# Patient Record
Sex: FEMALE | Race: BLACK OR AFRICAN AMERICAN | Employment: PART TIME | URBAN - METROPOLITAN AREA
[De-identification: names, ages, dates, MRNs, and addresses within clinical notes are randomized per-mention and may not be internally consistent; named-entity substitution may affect disease eponyms.]

---

## 2017-02-17 ENCOUNTER — HOSPITAL ENCOUNTER (EMERGENCY)
Facility: CLINIC | Age: 32
Discharge: HOME | End: 2017-02-17
Attending: EMERGENCY MEDICINE | Admitting: EMERGENCY MEDICINE
Payer: COMMERCIAL

## 2017-02-17 ENCOUNTER — APPOINTMENT (OUTPATIENT)
Dept: ULTRASOUND IMAGING | Facility: CLINIC | Age: 32
End: 2017-02-17
Attending: EMERGENCY MEDICINE

## 2017-02-17 VITALS
RESPIRATION RATE: 18 BRPM | DIASTOLIC BLOOD PRESSURE: 60 MMHG | OXYGEN SATURATION: 100 % | BODY MASS INDEX: 34.86 KG/M2 | HEART RATE: 102 BPM | HEIGHT: 68 IN | SYSTOLIC BLOOD PRESSURE: 102 MMHG | WEIGHT: 230 LBS | TEMPERATURE: 98.3 F

## 2017-02-17 DIAGNOSIS — O20.9 HEMORRHAGE IN EARLY PREGNANCY: Primary | ICD-10-CM

## 2017-02-17 DIAGNOSIS — N93.9 VAGINAL BLEEDING: ICD-10-CM

## 2017-02-17 DIAGNOSIS — Z3A.01 7 WEEKS GESTATION OF PREGNANCY: ICD-10-CM

## 2017-02-17 LAB
ABO + RH BLD: NORMAL
ABO + RH BLD: NORMAL
B-HCG SERPL-ACNC: NORMAL IU/L
BASOPHILS # BLD AUTO: 0 10E9/L (ref 0–0.2)
BASOPHILS NFR BLD AUTO: 0.2 %
BLD GP AB SCN SERPL QL: NORMAL
BLOOD BANK CMNT PATIENT-IMP: NORMAL
DIFFERENTIAL METHOD BLD: NORMAL
EOSINOPHIL # BLD AUTO: 0.1 10E9/L (ref 0–0.7)
EOSINOPHIL NFR BLD AUTO: 1.6 %
ERYTHROCYTE [DISTWIDTH] IN BLOOD BY AUTOMATED COUNT: 14.9 % (ref 10–15)
HCT VFR BLD AUTO: 36.3 % (ref 35–47)
HGB BLD-MCNC: 11.8 G/DL (ref 11.7–15.7)
IMM GRANULOCYTES # BLD: 0 10E9/L (ref 0–0.4)
IMM GRANULOCYTES NFR BLD: 0.2 %
LYMPHOCYTES # BLD AUTO: 1.6 10E9/L (ref 0.8–5.3)
LYMPHOCYTES NFR BLD AUTO: 27.8 %
MCH RBC QN AUTO: 31.1 PG (ref 26.5–33)
MCHC RBC AUTO-ENTMCNC: 32.5 G/DL (ref 31.5–36.5)
MCV RBC AUTO: 96 FL (ref 78–100)
MONOCYTES # BLD AUTO: 0.4 10E9/L (ref 0–1.3)
MONOCYTES NFR BLD AUTO: 7.1 %
NEUTROPHILS # BLD AUTO: 3.5 10E9/L (ref 1.6–8.3)
NEUTROPHILS NFR BLD AUTO: 63.1 %
NRBC # BLD AUTO: 0 10*3/UL
NRBC BLD AUTO-RTO: 0 /100
PLATELET # BLD AUTO: 220 10E9/L (ref 150–450)
RBC # BLD AUTO: 3.8 10E12/L (ref 3.8–5.2)
SPECIMEN EXP DATE BLD: NORMAL
WBC # BLD AUTO: 5.6 10E9/L (ref 4–11)

## 2017-02-17 PROCEDURE — 85025 COMPLETE CBC W/AUTO DIFF WBC: CPT | Performed by: EMERGENCY MEDICINE

## 2017-02-17 PROCEDURE — 76801 OB US < 14 WKS SINGLE FETUS: CPT

## 2017-02-17 PROCEDURE — 86900 BLOOD TYPING SEROLOGIC ABO: CPT | Performed by: EMERGENCY MEDICINE

## 2017-02-17 PROCEDURE — 86850 RBC ANTIBODY SCREEN: CPT | Performed by: EMERGENCY MEDICINE

## 2017-02-17 PROCEDURE — 99284 EMERGENCY DEPT VISIT MOD MDM: CPT | Mod: Z6 | Performed by: EMERGENCY MEDICINE

## 2017-02-17 PROCEDURE — 99284 EMERGENCY DEPT VISIT MOD MDM: CPT | Mod: 25 | Performed by: EMERGENCY MEDICINE

## 2017-02-17 PROCEDURE — 84702 CHORIONIC GONADOTROPIN TEST: CPT | Performed by: EMERGENCY MEDICINE

## 2017-02-17 PROCEDURE — 86901 BLOOD TYPING SEROLOGIC RH(D): CPT | Performed by: EMERGENCY MEDICINE

## 2017-02-17 RX ORDER — PRENATAL VIT/IRON FUM/FOLIC AC 27MG-0.8MG
1 TABLET ORAL DAILY
COMMUNITY

## 2017-02-17 ASSESSMENT — ENCOUNTER SYMPTOMS
DIFFICULTY URINATING: 0
ABDOMINAL PAIN: 0
NECK STIFFNESS: 0
CONFUSION: 0
EYE REDNESS: 0
COLOR CHANGE: 0
HEADACHES: 0
SHORTNESS OF BREATH: 0
FEVER: 0
ARTHRALGIAS: 0

## 2017-02-17 NOTE — ED AVS SNAPSHOT
[facility], Emergency Department    [address]    [place] [address]    Phone:  [phone]    Fax:  [phone]                                       Satish Laguna   MRN: [id number]    Department:  [facility], Emergency Department   Date of Visit:  2/17/2017           Patient Information     Date Of Birth          1985        Your diagnoses for this visit were:     Vaginal bleeding        You were seen by Joseph Matson MD.      Follow-up Information     [facility]    Specialty:  Clinic    Contact information:    [address] [facility] [id number] [phone]        Discharge References/Attachments     PREGNANCY, BLEEDING DURING EARLY (ENGLISH)      24 Hour Appointment Hotline       To make an appointment at any [facility] clinic, call [phone] ([phone]). If you don't have a family doctor or clinic, we will help you find one. [facility] clinics are conveniently located to serve the needs of you and your family.             Review of your medicines      Our records show that you are taking the medicines listed below. If these are incorrect, please call your family doctor or clinic.        Dose / Directions Last dose taken    prenatal multivitamin  plus iron 27-0.8 MG Tabs per tablet   Dose:  1 tablet        Take 1 tablet by mouth daily   Refills:  0        TYLENOL PO   Dose:  500 mg        Take 500 mg by mouth every 8 hours as needed for mild pain or fever   Refills:  0                Procedures and tests performed during your visit     ABO/Rh type and screen    CBC with platelets differential    HCG quantitative pregnancy (blood)    OB  US 1st trimester w transvag      Orders Needing Specimen Collection     None      Pending Results     No orders found from 2/15/2017 to 2/18/2017.            Pending Culture Results     No orders found from 2/15/2017 to 2/18/2017.            Thank you for choosing [facility]       Thank you for choosing  "[facility] for your care. Our goal is always to provide you with excellent care. Hearing back from our patients is one way we can continue to improve our services. Please take a few minutes to complete the written survey that you may receive in the mail after you visit with us. Thank you!        [employer] Information     [employer] lets you send messages to your doctor, view your test results, renew your prescriptions, schedule appointments and more. To sign up, go to [email or web address] . Click on \"Log in\" on the left side of the screen, which will take you to the Welcome page. Then click on \"Sign up Now\" on the right side of the page.     You will be asked to enter the access code listed below, as well as some personal information. Please follow the directions to create your username and password.     Your access code is: [email or web address]  Expires: 2017 12:38 PM     Your access code will  in 90 days. If you need help or a new code, please call your [facility] clinic or [phone].        Care EveryWhere ID     This is your Care EveryWhere ID. This could be used by other organizations to access your [facility] medical records  [id number]        After Visit Summary       This is your record. Keep this with you and show to your community pharmacist(s) and doctor(s) at your next visit.                  "

## 2017-02-17 NOTE — ED PROVIDER NOTES
History     Chief Complaint   Patient presents with     Abdominal Pain     last cycle in Dec 21; poistive pregnant test last ; pinkish ligh little blood/ spotting; lower back pain with cramping     HPI  Satish Laguna is a 31 year old female with a history of  who presents to the Emergency Department for evaluation of abdominal pain.  The patient reports a normal period on 16 but had 3 days of bleeding on 17. She took an at home pregnancy test last week which was positive and made an appointment to see her OBGYN, who she has not yet seen. This morning she suddenly developed lower abdominal cramping, back pain and went to the bathroom and noticed light vaginal spotting. Her pain and bleeding lasted about 5 minutes. She has not experienced these symptoms with her prior pregnancies prompting her to come to the ED. Currently, she is asymptomatic. She denies decrease PO intake, nausea, vomiting or any other concerns or complaints at this time.     History reviewed. No pertinent past medical history.    History reviewed. No pertinent past surgical history.    No family history on file.    Social History   Substance Use Topics     Smoking status: Never Smoker     Smokeless tobacco: Not on file     Alcohol use No       No current facility-administered medications for this encounter.      Current Outpatient Prescriptions   Medication     Prenatal Vit-Fe Fumarate-FA (PRENATAL MULTIVITAMIN  PLUS IRON) 27-0.8 MG TABS per tablet     Acetaminophen (TYLENOL PO)     Facility-Administered Medications Ordered in Other Encounters   Medication     HYDROmorphone (PF) (DILAUDID) 1 MG/ML injection      No Known Allergies     I have reviewed the Medications, Allergies, Past Medical and Surgical History, and Social History in the Epic system.    Review of Systems   Constitutional: Negative for fever.   HENT: Negative for congestion.    Eyes: Negative for redness.   Respiratory: Negative for shortness of breath.   "  Cardiovascular: Negative for chest pain.   Gastrointestinal: Negative for abdominal pain.   Genitourinary: Negative for difficulty urinating.   Musculoskeletal: Negative for arthralgias and neck stiffness.   Skin: Negative for color change.   Neurological: Negative for headaches.   Psychiatric/Behavioral: Negative for confusion.       Physical Exam   BP: 126/56  Pulse: 96  Temp: 98.9  F (37.2  C)  Resp: 16  Height: 172.7 cm (5' 8\")  Weight: 104.3 kg (230 lb)  SpO2: 99 %  Physical Exam   Constitutional: No distress.   HENT:   Head: Atraumatic.   Mouth/Throat: Oropharynx is clear and moist. No oropharyngeal exudate.   Eyes: Pupils are equal, round, and reactive to light. No scleral icterus.   Cardiovascular: Normal heart sounds and intact distal pulses.    Pulmonary/Chest: Breath sounds normal. No respiratory distress.   Abdominal: Soft. Bowel sounds are normal. There is no tenderness.   Musculoskeletal: She exhibits no edema or tenderness.   Skin: Skin is warm. No rash noted. She is not diaphoretic.    scant amount of blood in the posterior portion of the vagina    ED Course     9:42 AM  The patient was seen and examined by Dr. Matson in Room 5.     ED Course     Procedures        Results for orders placed or performed during the hospital encounter of 02/17/17   OB  US 1st trimester w transvag    Narrative    ULTRASOUND OB LESS THAN 14 WEEKS WITH TRANSVAGINAL SINGLE IMAGING   2/17/2017 11:44 AM    HISTORY: Approximately 7 weeks pregnant with bleeding and cramping.    TECHNIQUE: Transabdominal imaging is performed.    COMPARISON:  None.    FINDINGS:      Endometrial stripe is thickened at 2.3 cm but no obvious gestational  sac is currently identified. Patient declined transvaginal imaging  therefore detection of a small sac is difficult. Overall exam is  difficult due to maternal body habitus.    Right ovary: Not visualized.  Left ovary: Not visualized.  Adnexal mass: None are visualized.  Free pelvic fluid: None.   "    Impression    IMPRESSION: Limited exam without the use of transvaginal imaging.  Thickened endometrium at 2.3 cm. Findings could be too early in the  pregnancy for visualization by ultrasound, however a missed  spontaneous  is also in the differential. Follow-up beta-hCG  and ultrasound is needed for confirmation.         PETER PAGAN MD   CBC with platelets differential   Result Value Ref Range    WBC 5.6 4.0 - 11.0 10e9/L    RBC Count 3.80 3.8 - 5.2 10e12/L    Hemoglobin 11.8 11.7 - 15.7 g/dL    Hematocrit 36.3 35.0 - 47.0 %    MCV 96 78 - 100 fl    MCH 31.1 26.5 - 33.0 pg    MCHC 32.5 31.5 - 36.5 g/dL    RDW 14.9 10.0 - 15.0 %    Platelet Count 220 150 - 450 10e9/L    Diff Method Automated Method     % Neutrophils 63.1 %    % Lymphocytes 27.8 %    % Monocytes 7.1 %    % Eosinophils 1.6 %    % Basophils 0.2 %    % Immature Granulocytes 0.2 %    Nucleated RBCs 0 0 /100    Absolute Neutrophil 3.5 1.6 - 8.3 10e9/L    Absolute Lymphocytes 1.6 0.8 - 5.3 10e9/L    Absolute Monocytes 0.4 0.0 - 1.3 10e9/L    Absolute Eosinophils 0.1 0.0 - 0.7 10e9/L    Absolute Basophils 0.0 0.0 - 0.2 10e9/L    Abs Immature Granulocytes 0.0 0 - 0.4 10e9/L    Absolute Nucleated RBC 0.0    HCG quantitative pregnancy (blood)   Result Value Ref Range    HCG Quantitative Serum 55590 IU/L   ABO/Rh type and screen   Result Value Ref Range    ABO O     RH(D)  Pos     Antibody Screen Neg     Test Valid Only At       [facility]    Specimen Expires 2017             Labs Ordered and Resulted from Time of ED Arrival Up to the Time of Departure from the ED   CBC WITH PLATELETS DIFFERENTIAL   HCG QUANTITATIVE PREGNANCY   ABO/RH TYPE AND SCREEN       Assessments & Plan (with Medical Decision Making)   31-year-old female presents for evaluation of scant amount of vaginal bleeding in the setting of presumed seven-week pregnancy.  Exam reveals no evidence of active bleeding.  CBC is unremarkable  and normal.  Rh is positive and quantitative hCG was 14,342.  OB ultrasound revealed thickened endometrium without other findings.  At this point in time, I warned the patient that this could represent early pregnancy, spontaneous or missed  or ectopic.  As she is currently not having any symptoms or signs, I have recommended follow-up as previously scheduled with her OB/GYN in the next week.  She was also given miscarriage precautions with instructions to return to the emergency room for excessive bleeding.    I have reviewed the nursing notes.    I have reviewed the findings, diagnosis, plan and need for follow up with the patient.    Discharge Medication List as of 2017 12:38 PM          Final diagnoses:   Vaginal bleeding     Yamila LAYTON, am serving as a trained medical scribe to document services personally performed by Joseph Matson MD, based on the provider's statements to me.      Joseph LAYTON MD, was physically present and have reviewed and verified the accuracy of this note documented by Yamila Johnson.     2017   [facility], [place], EMERGENCY DEPARTMENT     Joseph Matson MD  [phone]

## 2017-02-17 NOTE — ED AVS SNAPSHOT
[facility], [place], Emergency Department    [address]    [facility] [address]    Phone:  [phone]    Fax:  [phone]                                       Satish Laguna   MRN: [id number]    Department:  [facility], Emergency Department   Date of Visit:  2/17/2017           After Visit Summary Signature Page     I have received my discharge instructions, and my questions have been answered. I have discussed any challenges I see with this plan with the nurse or doctor.    ..........................................................................................................................................  Patient/Patient Representative Signature      ..........................................................................................................................................  Patient Representative Print Name and Relationship to Patient    ..................................................               ................................................  Date                                            Time    ..........................................................................................................................................  Reviewed by Signature/Title    ...................................................              ..............................................  Date                                                            Time

## 2017-03-16 ENCOUNTER — TELEPHONE (OUTPATIENT)
Dept: OTHER | Facility: CLINIC | Age: 32
End: 2017-03-16

## 2017-03-16 NOTE — TELEPHONE ENCOUNTER
Call Regarding Onboarding P1 Other    Attempt 1    Message     Comments: Per patient picked up and unaware that she was even insured through her employer's healthplan.       Outreach   Martha Samuels

## 2017-03-21 ENCOUNTER — APPOINTMENT (OUTPATIENT)
Dept: ULTRASOUND IMAGING | Facility: CLINIC | Age: 32
End: 2017-03-21
Attending: EMERGENCY MEDICINE
Payer: COMMERCIAL

## 2017-03-21 ENCOUNTER — HOSPITAL ENCOUNTER (EMERGENCY)
Facility: CLINIC | Age: 32
Discharge: HOME | End: 2017-03-21
Attending: EMERGENCY MEDICINE | Admitting: EMERGENCY MEDICINE
Payer: COMMERCIAL

## 2017-03-21 VITALS
TEMPERATURE: 97.4 F | OXYGEN SATURATION: 99 % | HEART RATE: 96 BPM | DIASTOLIC BLOOD PRESSURE: 67 MMHG | RESPIRATION RATE: 19 BRPM | SYSTOLIC BLOOD PRESSURE: 121 MMHG

## 2017-03-21 DIAGNOSIS — Z3A.10 10 WEEKS GESTATION OF PREGNANCY: Primary | ICD-10-CM

## 2017-03-21 DIAGNOSIS — O20.0 THREATENED ABORTION: ICD-10-CM

## 2017-03-21 LAB
B-HCG SERPL-ACNC: ABNORMAL IU/L (ref 0–5)
BASOPHILS # BLD AUTO: 0 10E9/L (ref 0–0.2)
BASOPHILS NFR BLD AUTO: 0.1 %
DIFFERENTIAL METHOD BLD: ABNORMAL
EOSINOPHIL # BLD AUTO: 0.1 10E9/L (ref 0–0.7)
EOSINOPHIL NFR BLD AUTO: 1.1 %
ERYTHROCYTE [DISTWIDTH] IN BLOOD BY AUTOMATED COUNT: 14.1 % (ref 10–15)
HCT VFR BLD AUTO: 32.4 % (ref 35–47)
HGB BLD-MCNC: 10.8 G/DL (ref 11.7–15.7)
IMM GRANULOCYTES # BLD: 0.1 10E9/L (ref 0–0.4)
IMM GRANULOCYTES NFR BLD: 0.7 %
LYMPHOCYTES # BLD AUTO: 1.4 10E9/L (ref 0.8–5.3)
LYMPHOCYTES NFR BLD AUTO: 18.8 %
MCH RBC QN AUTO: 31.1 PG (ref 26.5–33)
MCHC RBC AUTO-ENTMCNC: 33.3 G/DL (ref 31.5–36.5)
MCV RBC AUTO: 93 FL (ref 78–100)
MONOCYTES # BLD AUTO: 0.5 10E9/L (ref 0–1.3)
MONOCYTES NFR BLD AUTO: 6.4 %
NEUTROPHILS # BLD AUTO: 5.4 10E9/L (ref 1.6–8.3)
NEUTROPHILS NFR BLD AUTO: 72.9 %
NRBC # BLD AUTO: 0 10*3/UL
NRBC BLD AUTO-RTO: 0 /100
PLATELET # BLD AUTO: 265 10E9/L (ref 150–450)
RBC # BLD AUTO: 3.47 10E12/L (ref 3.8–5.2)
WBC # BLD AUTO: 7.4 10E9/L (ref 4–11)

## 2017-03-21 PROCEDURE — 84702 CHORIONIC GONADOTROPIN TEST: CPT | Performed by: EMERGENCY MEDICINE

## 2017-03-21 PROCEDURE — 76801 OB US < 14 WKS SINGLE FETUS: CPT

## 2017-03-21 PROCEDURE — 85025 COMPLETE CBC W/AUTO DIFF WBC: CPT | Performed by: EMERGENCY MEDICINE

## 2017-03-21 PROCEDURE — 99284 EMERGENCY DEPT VISIT MOD MDM: CPT | Mod: 25 | Performed by: EMERGENCY MEDICINE

## 2017-03-21 PROCEDURE — 99284 EMERGENCY DEPT VISIT MOD MDM: CPT | Mod: Z6 | Performed by: EMERGENCY MEDICINE

## 2017-03-21 ASSESSMENT — ENCOUNTER SYMPTOMS
CHILLS: 1
DIZZINESS: 1
ABDOMINAL PAIN: 0
FEVER: 0
SHORTNESS OF BREATH: 0

## 2017-03-21 NOTE — ED AVS SNAPSHOT
[facility], Emergency Department    [address]    [place] [address]    Phone:  [phone]    Fax:  [phone]                                       Satish Laguna   MRN: [id number]    Department:  [facility], Emergency Department   Date of Visit:  3/21/2017           Patient Information     Date Of Birth          1985        Your diagnoses for this visit were:     Threatened         You were seen by Carla Quarles MD.        Discharge Instructions       If you are soaking a full pad in an hour for more than 2 hours in a row, or have any fevers, chills, foul smelling vaginal discharge, or worsening abdominal pain, you should return to the ER. Follow up with your OB doctor tomorrow.    Discharge References/Attachments     MISCARRIAGE, INCOMPLETE (ENGLISH)      24 Hour Appointment Hotline       To make an appointment at any [facility] clinic, call [phone] ([phone]). If you don't have a family doctor or clinic, we will help you find one. [facility] clinics are conveniently located to serve the needs of you and your family.             Review of your medicines      Our records show that you are taking the medicines listed below. If these are incorrect, please call your family doctor or clinic.        Dose / Directions Last dose taken    prenatal multivitamin  plus iron 27-0.8 MG Tabs per tablet   Dose:  1 tablet        Take 1 tablet by mouth daily   Refills:  0        TYLENOL PO   Dose:  500 mg        Take 500 mg by mouth every 8 hours as needed for mild pain or fever   Refills:  0                Procedures and tests performed during your visit     CBC with platelets differential    HCG quantitative pregnancy (blood)    OB  US 1st trimester w transvag      Orders Needing Specimen Collection     None      Pending Results     No orders found from 3/19/2017 to 3/22/2017.            Pending Culture Results     No orders found from 3/19/2017 to 3/22/2017.            Thank you  "for choosing [facility]       Thank you for choosing [facility] for your care. Our goal is always to provide you with excellent care. Hearing back from our patients is one way we can continue to improve our services. Please take a few minutes to complete the written survey that you may receive in the mail after you visit with us. Thank you!        [employer] Information     [employer] lets you send messages to your doctor, view your test results, renew your prescriptions, schedule appointments and more. To sign up, go to [email or web address] . Click on \"Log in\" on the left side of the screen, which will take you to the Welcome page. Then click on \"Sign up Now\" on the right side of the page.     You will be asked to enter the access code listed below, as well as some personal information. Please follow the directions to create your username and password.     Your access code is: [email or web address]  Expires: 2017  1:38 PM     Your access code will  in 90 days. If you need help or a new code, please call your [facility] clinic or [phone].        Care EveryWhere ID     This is your Care EveryWhere ID. This could be used by other organizations to access your [facility] medical records  [id number]        After Visit Summary       This is your record. Keep this with you and show to your community pharmacist(s) and doctor(s) at your next visit.                  "

## 2017-03-21 NOTE — ED AVS SNAPSHOT
[facility], [place], Emergency Department    [address]    [facility] [address]    Phone:  [phone]    Fax:  [phone]                                       Satish Laguna   MRN: [id number]    Department:  [facility], Emergency Department   Date of Visit:  3/21/2017           After Visit Summary Signature Page     I have received my discharge instructions, and my questions have been answered. I have discussed any challenges I see with this plan with the nurse or doctor.    ..........................................................................................................................................  Patient/Patient Representative Signature      ..........................................................................................................................................  Patient Representative Print Name and Relationship to Patient    ..................................................               ................................................  Date                                            Time    ..........................................................................................................................................  Reviewed by Signature/Title    ...................................................              ..............................................  Date                                                            Time

## 2017-03-22 NOTE — DISCHARGE INSTRUCTIONS
If you are soaking a full pad in an hour for more than 2 hours in a row, or have any fevers, chills, foul smelling vaginal discharge, or worsening abdominal pain, you should return to the ER. Follow up with your OB doctor tomorrow.

## 2017-03-22 NOTE — ED PROVIDER NOTES
History     Chief Complaint   Patient presents with     Vaginal Bleeding     possible miscarriage; bleeding onset 1630 today at work; came home, passing blood and clots at home; seen by OBGYN prior to the bleeding and reportedly [id number] was fine and was told she was 10 weeks and 2 days gestation     HPI  Satish Laguna is a 32 year old female who presents to the Emergency Department with vaginal bleeding. She states that she went to an OB appointment today and had an ultrasound done 10w2d pregnant. She states that after getting home she was covered in blood. She states that she has saturated 2 pads today and has been passing clots. The patient states that she feels dizziness and chills.     She is .     Ultrasound from [facility] earlier today was read as:    Gestational sac: 4.3 cm, 19 weeks 5 days. Yolk sac 0.4 cm. Gestational sac remains low-lying with somewhat lobulated contours.    I have reviewed the Medications, Allergies, Past Medical and Surgical History, and Social History in the [facility] system.    PAST MEDICAL HISTORY  No past medical history on file.  PAST SURGICAL HISTORY  No past surgical history on file.  FAMILY HISTORY  No family history on file.  SOCIAL HISTORY  Social History   Substance Use Topics     Smoking status: Never Smoker     Smokeless tobacco: Not on file     Alcohol use No     MEDICATIONS  No current facility-administered medications for this encounter.      Current Outpatient Prescriptions   Medication     Prenatal Vit-Fe Fumarate-FA (PRENATAL MULTIVITAMIN  PLUS IRON) 27-0.8 MG TABS per tablet     Acetaminophen (TYLENOL PO)     Facility-Administered Medications Ordered in Other Encounters   Medication     HYDROmorphone (PF) (DILAUDID) 1 MG/ML injection     ALLERGIES  No Known Allergies   Review of Systems   Constitutional: Positive for chills. Negative for fever.   Respiratory: Negative for shortness of breath.    Cardiovascular: Negative for chest pain.    Gastrointestinal: Negative for abdominal pain.   Genitourinary: Positive for vaginal bleeding.   Neurological: Positive for dizziness.   All other systems reviewed and are negative.      Physical Exam   BP: (!) 117/38  Pulse: 98  Temp: 98.2  F (36.8  C)  Resp: 18  SpO2: 100 %  Physical Exam   Constitutional: No distress.   HENT:   Head: Atraumatic.   Mouth/Throat: Oropharynx is clear and moist. No oropharyngeal exudate.   Eyes: Pupils are equal, round, and reactive to light. No scleral icterus.   Cardiovascular: Normal heart sounds and intact distal pulses.    Pulmonary/Chest: Breath sounds normal. No respiratory distress.   Abdominal: Soft. Bowel sounds are normal. There is tenderness.       Genitourinary:   Genitourinary Comments: Pt clenches thighs together when bimanual is attempted -- unable to palpate cervix.   Musculoskeletal: She exhibits no edema or tenderness.   Skin: Skin is warm. No rash noted. She is not diaphoretic.       ED Course     ED Course     Procedures             Critical Care time:  none               Labs Ordered and Resulted from Time of ED Arrival Up to the Time of Departure from the ED   HCG QUANTITATIVE PREGNANCY - Abnormal; Notable for the following:        Result Value    HCG Quantitative Serum 16565 (*)     All other components within normal limits   CBC WITH PLATELETS DIFFERENTIAL - Abnormal; Notable for the following:     RBC Count 3.47 (*)     Hemoglobin 10.8 (*)     Hematocrit 32.4 (*)     All other components within normal limits       Assessments & Plan (with Medical Decision Making)   I attempted to do a bimanual exam, however, patient was really uncomfortable with this, and clenched her thighs together.  I was unable to adequately palpate the cervix.  I did get an ultrasound, which demonstrated an irregularly shaped gestational sac with 10 week 1 day live fetus in the lower uterine segment with probable dilation of the cervix, consistent with  in progress.  I did  phone the OB resident, who did come to see the patient.  The patient refused repeat pelvic exam.  They felt that the patient likely has an inevitable .  The patient preferred expected management, was uncomfortable with having any procedure that would terminate the pregnancy in an unnatural manner.  They discussed indications for emergent return, patient was comfortable the plan was discharged with the plan to follow-up tomorrow with her OB provider.  She appears hemodynamically stable at this time.        This part of the medical record was transcribed by Marky Freeman, Medical Scribe, from a dictation done by Carla Dickerson MD.     I have reviewed the nursing notes.  I have reviewed the findings, diagnosis, plan and need for follow up with the patient.    Discharge Medication List as of 3/21/2017 10:27 PM          Final diagnoses:   Threatened      I, Marky Freeman, am serving as a trained medical scribe to document services personally performed by Carla Quarles MD, based on the provider's statements to me.      ICarla MD, was physically present and have reviewed and verified the accuracy of this note documented by Marky Freeman.    3/21/2017   [facility], EMERGENCY DEPARTMENT     Carla Quarles MD  17 [phone]

## 2017-03-22 NOTE — CONSULTS
ED Consult Note - Ob/Gyn  Satish Laguna   1985  MRN [id number]    CC: vaginal bleeding    HPI: Satish Laguna is a 32 year old  at 10w0d gestation by 8w6d US who presents to the ED after passing a fist sized clot earlier today as well as many other smaller clots.  She reports she has known for about two weeks that she is undergoing a likely miscarriage.  Had some significant cramping when passing the clots, otherwise now not having any abdominal pain.  Feeling slightly tired and weak.  No fevers/chills, nausea or vomiting.  She is a patient at [facility] and has been followed with serial ultrasounds which show the pregnancy had moved downward into the lower uterine segment.    10 point ROS negative other than as noted above    OB Hx:  Three children;  section x3  No prior miscarriages    Medical History  Obesity    Surgical History   section x3    Current Facility-Administered Medications on File Prior to Encounter:  HYDROmorphone (PF) (DILAUDID) 1 MG/ML injection     Current Outpatient Prescriptions on File Prior to Encounter:  Prenatal Vit-Fe Fumarate-FA (PRENATAL MULTIVITAMIN  PLUS IRON) 27-0.8 MG TABS per tablet Take 1 tablet by mouth daily   Acetaminophen (TYLENOL PO) Take 500 mg by mouth every 8 hours as needed for mild pain or fever         No Known Allergies     Social History     Social History     Marital status:      Spouse name: N/A     Number of children: N/A     Years of education: N/A     Occupational History     Not on file.     Social History Main Topics     Smoking status: Never Smoker     Smokeless tobacco: Not on file     Alcohol use No     Drug use: No     Sexual activity: Not on file     Other Topics Concern     Not on file     Social History Narrative        Objective:  Vitals:    17 1850   BP: (!) 117/38   Pulse: 98   Resp: 18   Temp: 98.2  F (36.8  C)   TempSrc: Oral   SpO2: 100%      Gen: resting comfortably, NAD  Abd: soft, nontender  : one  small drop of blood on peripad; otherwise dry.  Patient declines repeat pelvic exam    Labs/Imaging:  Results for orders placed or performed during the hospital encounter of 03/21/17 (from the past 24 hour(s))   HCG quantitative pregnancy (blood)   Result Value Ref Range    HCG Quantitative Serum 50711 (H) 0 - 5 IU/L   CBC with platelets differential   Result Value Ref Range    WBC 7.4 4.0 - 11.0 10e9/L    RBC Count 3.47 (L) 3.8 - 5.2 10e12/L    Hemoglobin 10.8 (L) 11.7 - 15.7 g/dL    Hematocrit 32.4 (L) 35.0 - 47.0 %    MCV 93 78 - 100 fl    MCH 31.1 26.5 - 33.0 pg    MCHC 33.3 31.5 - 36.5 g/dL    RDW 14.1 10.0 - 15.0 %    Platelet Count 265 150 - 450 10e9/L    Diff Method Automated Method     % Neutrophils 72.9 %    % Lymphocytes 18.8 %    % Monocytes 6.4 %    % Eosinophils 1.1 %    % Basophils 0.1 %    % Immature Granulocytes 0.7 %    Nucleated RBCs 0 0 /100    Absolute Neutrophil 5.4 1.6 - 8.3 10e9/L    Absolute Lymphocytes 1.4 0.8 - 5.3 10e9/L    Absolute Monocytes 0.5 0.0 - 1.3 10e9/L    Absolute Eosinophils 0.1 0.0 - 0.7 10e9/L    Absolute Basophils 0.0 0.0 - 0.2 10e9/L    Abs Immature Granulocytes 0.1 0 - 0.4 10e9/L    Absolute Nucleated RBC 0.0    OB  US 1st trimester w transvag    Narrative    OBSTETRIC ULTRASOUND WITH ENDOVAGINAL TRANSDUCER    3/21/2017 8:23 PM     HISTORY: 10 weeks pregnant with bleeding.    TECHNIQUE:  Endovaginal sonography was added to the transabdominal  exam to better evaluate the uterus and ovaries because of inadequate  bladder fullness.    COMPARISON: None.    FINDINGS: There is an irregularly shaped gestational sac in the lower  uterine segment with mildly dilated cervix. Fetus is 3.2 cm long  consistent with 10 weeks 1 day gestational age, with heart rate of 186  bpm. Corpus luteum noted in the right ovary. Left ovary appears  normal. No free fluid.      Impression    IMPRESSION: Irregularly shaped gestational sac with 10 week 1 day plus  or minus 1 week live fetus in the lower  uterine segment with probable  dilatation of the cervix, consistent with  in progress.    PENNY BARNHART MD       Assessment/Plan: Satish Laguna is a 32 year old  at 10w0d by 8w6d US who presents with inevitable miscarriage.  Currently hemodynamically stable and bleeding has stopped for now.  Hgb 10.8; Rh positive.  Current ongoing fetal cardiac activity; patient does not want to do anything to interrupt the pregnancy due to this; but also understands this is going to end in a miscarriage and wishes it to be over.  Reviewed she is okay to continue with expectant management as that is her desire.  Advised that if she develop signs/symptoms of infection or hemorrhage, would need to intervene sooner.    -okay to discharge home  -patient will call clinic tomorrow and update about ED visit; f/u with Primary OB  -okay to start iron supplementation per patient request  -return to the ED with vaginal bleeding soaking 1 pad per hour for more than 2 hours in a row; any fevers/chills, purulent vaginal discharge or worsening abdominal pain  -patient counseled on what to expect with miscarriage and likely pattern of bleeding/cramping she will experience.    Patient seen and staffed with Dr. Yamila Ray MD  OB/GYN Resident, PGY3  17 10:38 PM      Physician Attestation   I, Yamila Boo, personally examined and evaluated this patient.  I discussed the patient with the resident and care team, and agree with the assessment and plan of care as documented in the resident s note of 17  [date].      I personally reviewed vital signs, medications, labs and exam.    Key findings: Bleeding stable. Inevitable miscarriage. Patient has close clinic follow-up available.  Yamila Boo  Date of Service (when I saw the patient): 17

## 2017-03-24 ENCOUNTER — APPOINTMENT (OUTPATIENT)
Dept: ULTRASOUND IMAGING | Facility: CLINIC | Age: 32
End: 2017-03-24
Attending: EMERGENCY MEDICINE
Payer: COMMERCIAL

## 2017-03-24 ENCOUNTER — HOSPITAL ENCOUNTER (OUTPATIENT)
Facility: CLINIC | Age: 32
Setting detail: OBSERVATION
Discharge: HOME | End: 2017-03-25
Attending: EMERGENCY MEDICINE | Admitting: OBSTETRICS & GYNECOLOGY
Payer: COMMERCIAL

## 2017-03-24 DIAGNOSIS — D50.9 IRON DEFICIENCY ANEMIA, UNSPECIFIED IRON DEFICIENCY ANEMIA TYPE: ICD-10-CM

## 2017-03-24 DIAGNOSIS — D62 ANEMIA DUE TO BLOOD LOSS, ACUTE: ICD-10-CM

## 2017-03-24 DIAGNOSIS — N93.9 VAGINAL BLEEDING: ICD-10-CM

## 2017-03-24 DIAGNOSIS — O03.9 SPONTANEOUS ABORTION: Primary | ICD-10-CM

## 2017-03-24 LAB
ABO + RH BLD: NORMAL
ABO + RH BLD: NORMAL
ALBUMIN UR-MCNC: 30 MG/DL
ANION GAP SERPL CALCULATED.3IONS-SCNC: 8 MMOL/L (ref 3–14)
APPEARANCE UR: ABNORMAL
B-HCG SERPL-ACNC: ABNORMAL IU/L (ref 0–5)
BACTERIA #/AREA URNS HPF: ABNORMAL /HPF
BASOPHILS # BLD AUTO: 0 10E9/L (ref 0–0.2)
BASOPHILS NFR BLD AUTO: 0.1 %
BILIRUB UR QL STRIP: NEGATIVE
BLD GP AB SCN SERPL QL: NORMAL
BLD PROD TYP BPU: NORMAL
BLOOD BANK CMNT PATIENT-IMP: NORMAL
BUN SERPL-MCNC: 12 MG/DL (ref 7–30)
CALCIUM SERPL-MCNC: 8.3 MG/DL (ref 8.5–10.1)
CHLORIDE SERPL-SCNC: 105 MMOL/L (ref 94–109)
CO2 SERPL-SCNC: 25 MMOL/L (ref 20–32)
COLOR UR AUTO: ABNORMAL
CREAT SERPL-MCNC: 0.54 MG/DL (ref 0.52–1.04)
DIFFERENTIAL METHOD BLD: ABNORMAL
EOSINOPHIL # BLD AUTO: 0.1 10E9/L (ref 0–0.7)
EOSINOPHIL NFR BLD AUTO: 0.6 %
ERYTHROCYTE [DISTWIDTH] IN BLOOD BY AUTOMATED COUNT: 14.2 % (ref 10–15)
GFR SERPL CREATININE-BSD FRML MDRD: ABNORMAL ML/MIN/1.7M2
GLUCOSE SERPL-MCNC: 139 MG/DL (ref 70–99)
GLUCOSE UR STRIP-MCNC: NEGATIVE MG/DL
HCT VFR BLD AUTO: 27.4 % (ref 35–47)
HGB BLD-MCNC: 6.6 G/DL (ref 11.7–15.7)
HGB BLD-MCNC: 9 G/DL (ref 11.7–15.7)
HGB UR QL STRIP: ABNORMAL
HYALINE CASTS #/AREA URNS LPF: 9 /LPF (ref 0–2)
IMM GRANULOCYTES # BLD: 0 10E9/L (ref 0–0.4)
IMM GRANULOCYTES NFR BLD: 0.3 %
INR PPP: 1.11 (ref 0.86–1.14)
KETONES UR STRIP-MCNC: NEGATIVE MG/DL
LEUKOCYTE ESTERASE UR QL STRIP: ABNORMAL
LYMPHOCYTES # BLD AUTO: 1.5 10E9/L (ref 0.8–5.3)
LYMPHOCYTES NFR BLD AUTO: 14.1 %
MCH RBC QN AUTO: 30.7 PG (ref 26.5–33)
MCHC RBC AUTO-ENTMCNC: 32.8 G/DL (ref 31.5–36.5)
MCV RBC AUTO: 94 FL (ref 78–100)
MONOCYTES # BLD AUTO: 0.5 10E9/L (ref 0–1.3)
MONOCYTES NFR BLD AUTO: 4.8 %
MUCOUS THREADS #/AREA URNS LPF: PRESENT /LPF
NEUTROPHILS # BLD AUTO: 8.4 10E9/L (ref 1.6–8.3)
NEUTROPHILS NFR BLD AUTO: 80.1 %
NITRATE UR QL: NEGATIVE
NRBC # BLD AUTO: 0 10*3/UL
NRBC BLD AUTO-RTO: 0 /100
NUM BPU REQUESTED: 1
PH UR STRIP: 5.5 PH (ref 5–7)
PLATELET # BLD AUTO: 245 10E9/L (ref 150–450)
POTASSIUM SERPL-SCNC: 3.6 MMOL/L (ref 3.4–5.3)
RBC # BLD AUTO: 2.93 10E12/L (ref 3.8–5.2)
RBC #/AREA URNS AUTO: >182 /HPF (ref 0–2)
SODIUM SERPL-SCNC: 138 MMOL/L (ref 133–144)
SP GR UR STRIP: 1.01 (ref 1–1.03)
SPECIMEN EXP DATE BLD: NORMAL
URN SPEC COLLECT METH UR: ABNORMAL
UROBILINOGEN UR STRIP-MCNC: 0.2 MG/DL (ref 0–2)
WBC # BLD AUTO: 10.5 10E9/L (ref 4–11)
WBC #/AREA URNS AUTO: 33 /HPF (ref 0–2)

## 2017-03-24 PROCEDURE — 86850 RBC ANTIBODY SCREEN: CPT | Performed by: EMERGENCY MEDICINE

## 2017-03-24 PROCEDURE — 81001 URINALYSIS AUTO W/SCOPE: CPT | Performed by: EMERGENCY MEDICINE

## 2017-03-24 PROCEDURE — 99214 OFFICE O/P EST MOD 30 MIN: CPT | Mod: GC | Performed by: OBSTETRICS & GYNECOLOGY

## 2017-03-24 PROCEDURE — 80048 BASIC METABOLIC PNL TOTAL CA: CPT | Performed by: EMERGENCY MEDICINE

## 2017-03-24 PROCEDURE — 85018 HEMOGLOBIN: CPT | Mod: 91 | Performed by: EMERGENCY MEDICINE

## 2017-03-24 PROCEDURE — 85025 COMPLETE CBC W/AUTO DIFF WBC: CPT | Performed by: EMERGENCY MEDICINE

## 2017-03-24 PROCEDURE — 84702 CHORIONIC GONADOTROPIN TEST: CPT | Performed by: EMERGENCY MEDICINE

## 2017-03-24 PROCEDURE — 96361 HYDRATE IV INFUSION ADD-ON: CPT | Performed by: EMERGENCY MEDICINE

## 2017-03-24 PROCEDURE — 25000128 H RX IP 250 OP 636

## 2017-03-24 PROCEDURE — 85610 PROTHROMBIN TIME: CPT | Performed by: EMERGENCY MEDICINE

## 2017-03-24 PROCEDURE — 86923 COMPATIBILITY TEST ELECTRIC: CPT | Performed by: EMERGENCY MEDICINE

## 2017-03-24 PROCEDURE — 96360 HYDRATION IV INFUSION INIT: CPT | Performed by: EMERGENCY MEDICINE

## 2017-03-24 PROCEDURE — 76801 OB US < 14 WKS SINGLE FETUS: CPT

## 2017-03-24 PROCEDURE — 99285 EMERGENCY DEPT VISIT HI MDM: CPT | Mod: Z6 | Performed by: EMERGENCY MEDICINE

## 2017-03-24 PROCEDURE — 86901 BLOOD TYPING SEROLOGIC RH(D): CPT | Performed by: EMERGENCY MEDICINE

## 2017-03-24 PROCEDURE — 25000128 H RX IP 250 OP 636: Performed by: EMERGENCY MEDICINE

## 2017-03-24 PROCEDURE — 86900 BLOOD TYPING SEROLOGIC ABO: CPT | Performed by: EMERGENCY MEDICINE

## 2017-03-24 PROCEDURE — 99285 EMERGENCY DEPT VISIT HI MDM: CPT | Mod: 25 | Performed by: EMERGENCY MEDICINE

## 2017-03-24 PROCEDURE — 87086 URINE CULTURE/COLONY COUNT: CPT | Performed by: EMERGENCY MEDICINE

## 2017-03-24 RX ORDER — SODIUM CHLORIDE 9 MG/ML
INJECTION, SOLUTION INTRAVENOUS
Status: COMPLETED
Start: 2017-03-24 | End: 2017-03-24

## 2017-03-24 RX ORDER — SODIUM CHLORIDE 9 MG/ML
1000 INJECTION, SOLUTION INTRAVENOUS CONTINUOUS
Status: DISCONTINUED | OUTPATIENT
Start: 2017-03-24 | End: 2017-03-25 | Stop reason: HOSPADM

## 2017-03-24 RX ADMIN — SODIUM CHLORIDE 1000 ML: 9 INJECTION, SOLUTION INTRAVENOUS at 22:12

## 2017-03-24 RX ADMIN — SODIUM CHLORIDE 1000 ML: 9 INJECTION, SOLUTION INTRAVENOUS at 19:37

## 2017-03-24 RX ADMIN — SODIUM CHLORIDE 1000 ML: 900 INJECTION, SOLUTION INTRAVENOUS at 19:37

## 2017-03-24 RX ADMIN — Medication 1000 ML: at 18:51

## 2017-03-24 RX ADMIN — SODIUM CHLORIDE: 900 INJECTION, SOLUTION INTRAVENOUS at 18:10

## 2017-03-24 RX ADMIN — SODIUM CHLORIDE 1000 ML: 9 INJECTION, SOLUTION INTRAVENOUS at 18:51

## 2017-03-24 ASSESSMENT — ENCOUNTER SYMPTOMS
FREQUENCY: 0
NAUSEA: 0
HEMATURIA: 0
DIARRHEA: 0
LIGHT-HEADEDNESS: 1
CONSTIPATION: 0
VOMITING: 0
DIZZINESS: 1
DIFFICULTY URINATING: 0
DYSURIA: 0
SHORTNESS OF BREATH: 0
ABDOMINAL PAIN: 1

## 2017-03-24 NOTE — ED PROVIDER NOTES
History     Chief Complaint   Patient presents with     Vaginal Bleeding     HPI  Satish Laguna is a 32 year old,  female, 10w4d pregnant by ultrasound on 3/21/17 who presents with vaginal bleeding. The patient reports that she started having vaginal bleeding again today around 3:45 PM. Initially, the bleeding was very heavy. She did pass some tissue, which she reports was blue in color, followed by rather large clots. She notes that the bleeding has since slowed, though she continues to pass. She complains of some dizziness and lightheadedness. She denies any syncope, and reports this improved after sitting down on the couch. She denies any chest pain, shortness of breath, nausea, vomiting or any changes in bowel or bladder habits. She complains of lower abdominal cramping.  No other new symptoms or complaints at this time.  Please see ROS for further details.    Of note, the patient was seen here in the ED for vaginal bleeding on 3/21/17 (3 days ago) and at that time, she had an ultrasound which revealed an irregularly shaped gestational sac with 10 week 1 day live fetus in the lower uterine segment with probable dilation of the cervix, consistent with  in progress.     Pertinent PMH/PSH: Inevtivable AB on recent visit. Otherwise see EMR.     No family history on file.    Social History   Substance Use Topics     Smoking status: Never Smoker     Smokeless tobacco: Not on file     Alcohol use No     Current Facility-Administered Medications   Medication     0.9% sodium chloride BOLUS    Followed by     0.9% sodium chloride infusion     Current Outpatient Prescriptions   Medication     Prenatal Vit-Fe Fumarate-FA (PRENATAL MULTIVITAMIN  PLUS IRON) 27-0.8 MG TABS per tablet     Acetaminophen (TYLENOL PO)     Facility-Administered Medications Ordered in Other Encounters   Medication     HYDROmorphone (PF) (DILAUDID) 1 MG/ML injection      No Known Allergies     I have reviewed the Medications,  Allergies, Past Medical and Surgical History, and Social History in the Epic system.    Review of Systems   Respiratory: Negative for shortness of breath.    Cardiovascular: Negative for chest pain.   Gastrointestinal: Positive for abdominal pain (lower, cramping). Negative for constipation, diarrhea, nausea and vomiting.   Genitourinary: Positive for vaginal bleeding. Negative for difficulty urinating, dysuria, frequency, hematuria and urgency.   Neurological: Positive for dizziness and light-headedness.   All other systems reviewed and are negative.      Physical Exam   BP: 101/65  Pulse: 94  Temp: 97.3  F (36.3  C)  Resp: 16  Weight: 104.3 kg (230 lb)  SpO2: 100 %  Physical Exam  CONSTITUTIONAL: Well-developed and well-nourished. Awake and alert. Non-toxic appearance. No acute distress.   HENT:   - Head: Normocephalic and atraumatic.   - Ears: Hearing and external ear grossly normal.   - Nose: Nose normal. No rhinorrhea. No epistaxis.   - Mouth/Throat: Oropharynx is clear and moist and mucous membranes are normal.   EYES: Conjunctivae and lids are normal. No scleral icterus.   NECK: Normal range of motion and phonation normal. Neck supple. No JVD present. No tracheal deviation, no stridor. No edema or erythema noted.  CARDIOVASCULAR: Normal rate, regular rhythm and no appreciable abnormal heart sounds.  PULMONARY/CHEST: Effort normal. No accessory muscle usage or stridor. No respiratory distress.  No appreciable abnormal breath sounds.  ABDOMEN: Soft, non-distended.  Mild lower abdominal discomfort to palpation.  No peritoneal findings.  No rigidity, rebound or guarding.   MUSCULOSKELETAL: Extremities warm and seemingly well perfused. No edema or calf tenderness.  NEUROLOGIC: Awake, alert. Not disoriented. She displays no atrophy and no tremor. Normal tone. No seizure activity. Coordination normal. GCS 15  SKIN: Skin is warm and dry. No rash noted. No diaphoresis. No pallor.   PSYCHIATRIC: Normal mood and affect.  Speech and behavior normal. Thought processes linear. Cognition and memory are normal.     ED Course     ED Course   Value Comment By Time   Hemoglobin: (!) 9.0 Down from 10.8 (3 days ago), still bleeding. Just passed more clots. Will get serial Hgbs. Thelma Iraheta MD 1920    Discussed patient's case with OB.  They will follow up on her US results and consult for us. Thelma Iraheta MD 2012    Spoke with Radiology about pt's US after written prelim report wouldn't come through correctly: Cervical canal still thickened, still remaining 4mm indeterminant cystic area, still has complex material in the cervix. No evidence for living fetus. Can't exclude retained products. Thelma Iraheta MD 2226     Procedures     6:33 PM  The patient was seen and examined by Dr. Iraheta in Room 6.                 Labs Ordered and Resulted from Time of ED Arrival Up to the Time of Departure from the ED   CBC WITH PLATELETS DIFFERENTIAL - Abnormal; Notable for the following:        Result Value    RBC Count 2.93 (*)     Hemoglobin 9.0 (*)     Hematocrit 27.4 (*)     Absolute Neutrophil 8.4 (*)     All other components within normal limits   BASIC METABOLIC PANEL - Abnormal; Notable for the following:     Glucose 139 (*)     Calcium 8.3 (*)     All other components within normal limits   INR   HCG QUANTITATIVE PREGNANCY   ROUTINE UA WITH MICROSCOPIC REFLEX TO CULTURE   ABO/RH TYPE AND SCREEN       Assessments & Plan (with Medical Decision Making)   IMPRESSION: 32-year-old female, , at approximately 10w5d gestation, who was just seen in the ED 3 days ago for VB.  During that ED visit a pelvic ultrasound was obtained that showed an irregularly shaped gestational sac with living fetus in the lower uterine segment with probable dilation of the cervix consistent with ongoing AB. Pt refused pelvic exam at that time. OB thought to be inevitable AB, discussed options and pt elected to D/C and F/U with OB the next  day.  Patient presents today with increased VB and passing of clots, brief episode of dizziness sensation (improved with lying flat and IVF), as described further in the HPI/ROS.  Clinically, she appears nontoxic.  Her BP is on the low end of normal currently though she has been in this range before in February prior to that and typically had somewhat higher numbers. HR is not significant change from previous ranges.  Afebrile. Mild lower abdominal pain on palpation. No peritoneal findings.     It sounds as though patient may have completed her previous inevitable AB.  That said even though she is laying flat and has already gotten nearly a liter of fluids she is still feeling dizzy and I am concerned for what sounds like a fairly significant amount of blood loss.  We will be getting laboratory studies and serial hemoglobins as well as performing pelvic ultrasound to evaluate for any retained POC or other acute findings.  Continue to monitor closely for hemodynamics, and potential need for transfusion, etc. Patient defers pelvic exam until pelvic US results.    PLAN: Labs (including type & screen, bHCG quant), etc. Pelvic US . Urine studies. Symptom/fluid management.     RESULTS:  - See ED Course above for pertinent results and correlating MDM  - Labs: Hgb decreased  - Urine:  Difficult to assess the setting of significant amount of blood.  Will wait to write any prescriptions for UTI until urine culture returns.  - Imaging: Images and written preliminary reports reviewed by myself and revealed:  --- Pelvic US: Spontaneous AB    INTERVENTIONS:   - IVF    RE-EVALUATION:  - The patient's symptoms were initially somewhat improved but then she continued to complain of dizziness even when lying down.  - Repeat hemoglobin 6.6  - BP remains appropriate.     DISCUSSIONS:  - Discuss. findings from today.  After return of new hemoglobin being markedly lower we discussed the R/B/A of blood transfusions which the patient has  agreed and signed the appropriate consent form.     DISPOSITION/PLANNING:  - FINAL IMPRESSION: Vaginal bleeding with acute blood loss anemia requiring transfusion  - DISPOSITION:  Admit to ObGyn  --- Pending: Urine culture      ______________________________________________________________________________    - I have reviewed the findings, plan with the patient and her family. They expressed understanding and agreement with this plan. All questions answered to the best of our ability at this time.           New Prescriptions    No medications on file       Final diagnoses:   None     IGi, am serving as a trained medical scribe to document services personally performed by Thelma Iraheta MD, based on the provider's statements to me.   Thelma LAYTON MD, was physically present and have reviewed and verified the accuracy of this note documented by Gi Jurado.     3/24/2017   [facility], EMERGENCY DEPARTMENT     Thelma Iraheta MD  03/26/17 9762

## 2017-03-24 NOTE — IP AVS SNAPSHOT
"                  MRN:[id number]                      After Visit Summary   3/24/2017    Satish Laguna    MRN: [id number]           Thank you!     Thank you for choosing [facility] for your care. Our goal is always to provide you with excellent care. Hearing back from our patients is one way we can continue to improve our services. Please take a few minutes to complete the written survey that you may receive in the mail after you visit with us. Thank you!        Patient Information     Date Of Birth          1985        About your hospital stay     You were admitted on:  March 25, 2017 You last received care in the:  [facility]    You were discharged on:  March 25, 2017       Who to Call     For medical emergencies, please call 911.  For non-urgent questions about your medical care, please call your primary care provider or clinic, [phone]          Attending Provider     Provider Specialty    hTelma Iraheta MD Emergency Medicine    Casandra Adorno MD OB/Gyn       Primary Care Provider Office Phone # Fax #    [facility] [phone] [address] [address]        Pending Results     Date and Time Order Name Status Description    3/24/2017 2054 Urine Culture Aerobic Bacterial Preliminary             Statement of Approval     Ordered          03/25/17 1117  I have reviewed and agree with all the recommendations and orders detailed in this document.  EFFECTIVE NOW     Approved and electronically signed by:  Rosa Mosher MD             Admission Information     Date & Time Provider Department Dept. Phone    3/24/2017 Casandra Adorno MD [facility] [phone]      Your Vitals Were     Blood Pressure Pulse Temperature Respirations Height Weight    122/66 (BP Location: Right arm) 96 97.3  F (36.3  C) (Oral) 16 1.727 m (5' 8\") 115.4 kg (254 lb 6.4 oz)    Last Period Pulse Oximetry BMI (Body Mass Index)             (LMP Unknown) 100% 38.68 kg/m2       " "  MyChart Information     [facility] lets you send messages to your doctor, view your test results, renew your prescriptions, schedule appointments and more. To sign up, go to [email or web address] . Click on \"Log in\" on the left side of the screen, which will take you to the Welcome page. Then click on \"Sign up Now\" on the right side of the page.     You will be asked to enter the access code listed below, as well as some personal information. Please follow the directions to create your username and password.     Your access code is: [email or web address]  Expires: 2017  1:38 PM     Your access code will  in 90 days. If you need help or a new code, please call your [facility] clinic or [phone].        Care EveryWhere ID     This is your Care EveryWhere ID. This could be used by other organizations to access your [facility] medical records  [id number]           Review of your medicines      UNREVIEWED medicines. Ask your doctor about these medicines        Dose / Directions    TYLENOL PO        Dose:  500 mg   Take 500 mg by mouth every 8 hours as needed for mild pain or fever   Refills:  0         START taking        Dose / Directions    calcium carbonate-vitamin D 600-400 MG-UNIT Chew   Used for:  Spontaneous         Dose:  1 chew tab   Take 1 chew tab by mouth 2 times daily   Quantity:  180 tablet   Refills:  3       docusate sodium 100 MG capsule   Commonly known as:  COLACE        Dose:  100 mg   Take 1 capsule (100 mg) by mouth 2 times daily as needed for constipation   Quantity:  20 capsule   Refills:  0       ferrous sulfate 142 (45 FE) MG Tbcr   Commonly known as:  SLO-FE   Used for:  Vaginal bleeding, Spontaneous         Dose:  142 mg   Take 1 tablet (142 mg) by mouth daily   Quantity:  50 tablet   Refills:  1       ibuprofen 600 MG tablet   Commonly known as:  ADVIL/MOTRIN   Used for:  Spontaneous         Dose:  600 mg   Take 1 tablet (600 mg) by mouth every 6 hours as needed " for other (mild pain)   Quantity:  30 tablet   Refills:  0         CONTINUE these medicines which have NOT CHANGED        Dose / Directions    prenatal multivitamin  plus iron 27-0.8 MG Tabs per tablet        Dose:  1 tablet   Take 1 tablet by mouth daily   Refills:  0            Where to get your medicines      These medications were sent to [facility] [place], [place] - [address]  [address], [facility] [address]     Phone:  [phone]     calcium carbonate-vitamin D 600-400 MG-UNIT Chew    docusate sodium 100 MG capsule    ferrous sulfate 142 (45 FE) MG Tbcr    ibuprofen 600 MG tablet                Protect others around you: Learn how to safely use, store and throw away your medicines at www.disposemymeds.org.             Medication List: This is a list of all your medications and when to take them. Check marks below indicate your daily home schedule. Keep this list as a reference.      Medications           Morning Afternoon Evening Bedtime As Needed    calcium carbonate-vitamin D 600-400 MG-UNIT Chew   Take 1 chew tab by mouth 2 times daily                                docusate sodium 100 MG capsule   Commonly known as:  COLACE   Take 1 capsule (100 mg) by mouth 2 times daily as needed for constipation                                ferrous sulfate 142 (45 FE) MG Tbcr   Commonly known as:  SLO-FE   Take 1 tablet (142 mg) by mouth daily                                ibuprofen 600 MG tablet   Commonly known as:  ADVIL/MOTRIN   Take 1 tablet (600 mg) by mouth every 6 hours as needed for other (mild pain)                                prenatal multivitamin  plus iron 27-0.8 MG Tabs per tablet   Take 1 tablet by mouth daily                                TYLENOL PO   Take 500 mg by mouth every 8 hours as needed for mild pain or fever

## 2017-03-24 NOTE — IP AVS SNAPSHOT
[facility] [address] [facility]    [address]    [place] [address]    Phone:  [phone]                                       After Visit Summary   3/24/2017    Satish Laguna    MRN: [id number]           After Visit Summary Signature Page     I have received my discharge instructions, and my questions have been answered. I have discussed any challenges I see with this plan with the nurse or doctor.    ..........................................................................................................................................  Patient/Patient Representative Signature      ..........................................................................................................................................  Patient Representative Print Name and Relationship to Patient    ..................................................               ................................................  Date                                            Time    ..........................................................................................................................................  Reviewed by Signature/Title    ...................................................              ..............................................  Date                                                            Time

## 2017-03-24 NOTE — ED NOTES
"Per EMS report:   + miscarriage today, per EMS pt has been told about possibility of miscarriage. Last night pt experienced cramping sensation, pt took Tylenol then went to sleep. Today at around 3:45 pm pt began to bleed \"a lot of blood\" + dizziness  Pt has a G18 R Arm; IV bolus was given about 300-400 ml. VS: 111/77 HR:99 O2 sat 100% at 10-12 LPM non re breather mask. B  "

## 2017-03-24 NOTE — ED NOTES
Pt arrived with EMS with a lot of vaginal bleeding. Upon standing pt passed several large clots. Ambulated to the bathroom without issue where she passed more clots and discarded a soaked pad. Felt dizzy; O2 administered, which was helpful. Assisted back to bed with a new pad placed. VSS. Continues on supplemental O2 and IV fluids running upon arrival. Labs drawn from PIV and sent. Pt feeling better laying down with O2.

## 2017-03-24 NOTE — LETTER
[facility] [address]  [address]  [place] [address]  [phone]  Clinic #:  [phone]         March 25, 2017      Satish Laguna  [address]   [place] [address]      To Whom it May Concern:     Satish Laguna was hospitalized at [facility] on March 24-25 2017    and was given the following instructions:    Off work x 7 days.  May return to work April 1 2017.           Other Limitations:   None           Sincerely,          Rosa Mosher MD

## 2017-03-25 VITALS
SYSTOLIC BLOOD PRESSURE: 122 MMHG | TEMPERATURE: 97.3 F | OXYGEN SATURATION: 100 % | BODY MASS INDEX: 38.55 KG/M2 | WEIGHT: 254.4 LBS | HEIGHT: 68 IN | RESPIRATION RATE: 16 BRPM | HEART RATE: 96 BPM | DIASTOLIC BLOOD PRESSURE: 66 MMHG

## 2017-03-25 PROBLEM — D64.9 ANEMIA: Status: ACTIVE | Noted: 2017-03-25

## 2017-03-25 LAB
BLD PROD TYP BPU: NORMAL
BLD UNIT ID BPU: 0
BLOOD PRODUCT CODE: NORMAL
BPU ID: NORMAL
HGB BLD-MCNC: 6.9 G/DL (ref 11.7–15.7)
TRANSFUSION STATUS PATIENT QL: NORMAL
TRANSFUSION STATUS PATIENT QL: NORMAL

## 2017-03-25 PROCEDURE — 85018 HEMOGLOBIN: CPT | Performed by: OBSTETRICS & GYNECOLOGY

## 2017-03-25 PROCEDURE — 36430 TRANSFUSION BLD/BLD COMPNT: CPT

## 2017-03-25 PROCEDURE — G0378 HOSPITAL OBSERVATION PER HR: HCPCS

## 2017-03-25 PROCEDURE — 99217 ZZC OBSERVATION CARE DISCHARGE: CPT | Performed by: OBSTETRICS & GYNECOLOGY

## 2017-03-25 PROCEDURE — 36415 COLL VENOUS BLD VENIPUNCTURE: CPT | Performed by: OBSTETRICS & GYNECOLOGY

## 2017-03-25 PROCEDURE — P9016 RBC LEUKOCYTES REDUCED: HCPCS | Performed by: EMERGENCY MEDICINE

## 2017-03-25 PROCEDURE — 25000128 H RX IP 250 OP 636: Performed by: OBSTETRICS & GYNECOLOGY

## 2017-03-25 RX ORDER — DOCUSATE SODIUM 100 MG/1
100 CAPSULE, LIQUID FILLED ORAL 2 TIMES DAILY
Status: DISCONTINUED | OUTPATIENT
Start: 2017-03-25 | End: 2017-03-25 | Stop reason: HOSPADM

## 2017-03-25 RX ORDER — ONDANSETRON 4 MG/1
4 TABLET, ORALLY DISINTEGRATING ORAL EVERY 6 HOURS PRN
Status: DISCONTINUED | OUTPATIENT
Start: 2017-03-25 | End: 2017-03-25 | Stop reason: HOSPADM

## 2017-03-25 RX ORDER — ONDANSETRON 2 MG/ML
4 INJECTION INTRAMUSCULAR; INTRAVENOUS EVERY 6 HOURS PRN
Status: DISCONTINUED | OUTPATIENT
Start: 2017-03-25 | End: 2017-03-25 | Stop reason: HOSPADM

## 2017-03-25 RX ORDER — SODIUM CHLORIDE 9 MG/ML
INJECTION, SOLUTION INTRAVENOUS
Status: DISCONTINUED
Start: 2017-03-25 | End: 2017-03-25 | Stop reason: HOSPADM

## 2017-03-25 RX ORDER — SODIUM CHLORIDE 9 MG/ML
INJECTION, SOLUTION INTRAVENOUS CONTINUOUS
Status: DISCONTINUED | OUTPATIENT
Start: 2017-03-25 | End: 2017-03-25 | Stop reason: HOSPADM

## 2017-03-25 RX ORDER — NALOXONE HYDROCHLORIDE 0.4 MG/ML
.1-.4 INJECTION, SOLUTION INTRAMUSCULAR; INTRAVENOUS; SUBCUTANEOUS
Status: DISCONTINUED | OUTPATIENT
Start: 2017-03-25 | End: 2017-03-25 | Stop reason: HOSPADM

## 2017-03-25 RX ORDER — ACETAMINOPHEN 500 MG
500 TABLET ORAL EVERY 8 HOURS PRN
Status: DISCONTINUED | OUTPATIENT
Start: 2017-03-25 | End: 2017-03-25 | Stop reason: HOSPADM

## 2017-03-25 RX ORDER — IBUPROFEN 600 MG/1
600 TABLET, FILM COATED ORAL EVERY 6 HOURS PRN
Status: DISCONTINUED | OUTPATIENT
Start: 2017-03-25 | End: 2017-03-25 | Stop reason: HOSPADM

## 2017-03-25 RX ORDER — IBUPROFEN 600 MG/1
600 TABLET, FILM COATED ORAL EVERY 6 HOURS PRN
Qty: 30 TABLET | Refills: 0 | Status: SHIPPED | OUTPATIENT
Start: 2017-03-25

## 2017-03-25 RX ORDER — DOCUSATE SODIUM 100 MG/1
100 CAPSULE, LIQUID FILLED ORAL 2 TIMES DAILY PRN
Qty: 20 CAPSULE | Refills: 0 | Status: SHIPPED | OUTPATIENT
Start: 2017-03-25

## 2017-03-25 RX ADMIN — SODIUM CHLORIDE: 9 INJECTION, SOLUTION INTRAVENOUS at 01:00

## 2017-03-25 NOTE — DISCHARGE SUMMARY
"Discharge Summary    Satish Laguna MRN# [id number]   YOB: 1985 Age: 32 year old     Date of Admission:  3/24/2017  Date of Discharge:  3/25/2017  Admitting Physician:  Casandra Adorno MD  Discharge Physician:  Rosa Mosher MD (Contact: [phone])  Discharging Service:  Obstetrics and Gynecology     Home clinic: [facility]  Primary Provider: Andressa Rubio          Admission Diagnoses:   Vaginal bleeding [N93.9]          Discharge Diagnosis:   Patient Active Problem List   Diagnosis     Anemia                Discharge Disposition:   Discharged to home           Condition on Discharge:   Discharge condition: Satisfactory   Discharge vitals: Blood pressure 122/66, pulse 96, temperature 97.3  F (36.3  C), temperature source Oral, resp. rate 16, height 1.727 m (5' 8\"), weight 115.4 kg (254 lb 6.4 oz), SpO2 100 %, unknown if currently breastfeeding.     Code status on discharge: Full Code           Procedures:   No procedures performed during this admission.  Transfusion of one unit PRBCs           Medications Prior to Admission:     Prescriptions Prior to Admission   Medication Sig Dispense Refill Last Dose     Prenatal Vit-Fe Fumarate-FA (PRENATAL MULTIVITAMIN  PLUS IRON) 27-0.8 MG TABS per tablet Take 1 tablet by mouth daily   3/23/2017 at Unknown time     Acetaminophen (TYLENOL PO) Take 500 mg by mouth every 8 hours as needed for mild pain or fever   3/24/2017 at Unknown time             Discharge Medications:     Current Facility-Administered Medications   Medication     acetaminophen (TYLENOL) tablet 500 mg     naloxone (NARCAN) injection 0.1-0.4 mg     ondansetron (ZOFRAN-ODT) ODT tab 4 mg    Or     ondansetron (ZOFRAN) injection 4 mg     docusate sodium (COLACE) capsule 100 mg     ibuprofen (ADVIL/MOTRIN) tablet 600 mg     0.9% sodium chloride infusion     NaCl 0.9 % infusion     0.9% sodium chloride infusion     Facility-Administered Medications " Ordered in Other Encounters   Medication     HYDROmorphone (PF) (DILAUDID) 1 MG/ML injection             Consultations:   No consultations were requested during this admission             Brief History of Illness:   Satish Laguna is a 32 year old female who was admitted for heavy vaginal bleeding, acute blood loss anemia and dizzyness following SAB at home on 3/24 of 10 week IUP.           Hospital Course:     Anemia    Patient was transfused one unit PRBCs and observed for further bleeding.             Final Day of Progress before Discharge:       Assessment and Plan:  Active Problems:    Anemia    Assessment: stable after SAB     Plan: discharge to home, oral iron, f/u 2 weeks                 Data:  All laboratory data reviewed    All imaging studies reviewed by me.       Significant Results:   None             Pending Results:   None           Discharge Instructions and Follow-Up:   Discharge diet: Regular   Discharge activity: Activity as tolerated   Discharge follow-up: Follow up with primary care provider in 2 weeks                       Other instructions: Nothing per vagina x 2 weeks.  OK to shower

## 2017-03-25 NOTE — PROGRESS NOTES
"GYN Progress Note    S: Patient feels a lot better this morning. She says her bleeding has completely stopped. Denies chest pain, dizziness, light headedness or shortness of breath. Ambulating without difficulty. No cramping or pain.    O:  /48  Pulse 86  Temp 98.2  F (36.8  C) (Oral)  Resp 16  Ht 1.727 m (5' 8\")  Wt 115.4 kg (254 lb 6.4 oz)  LMP  (LMP Unknown)  SpO2 100%  Breastfeeding? Unknown  BMI 38.68 kg/m2    Gen ranjith: NAD, resting comfortably  CV: RRR, normal S1/S2  Resp: CTAB  Abd: soft, nontender to palpation  Ext: warm and well perfused, no edema    Hgb: 9.0>6.6>1U PRBCs>6.9    A/P: Satish Laguna is a 33 yo  who is HD#2 admitted for acute blood loss anemia 2/2 spontaneous complete  at home yesterday.  - Heme: Hgb janae to 6.9 after transfusion but drawn only 2 hours after transfusion finished. Symptomatically patient feeling much better.  - Pain: no pain, ibuprofen ordered prn  - Dispo: plan to DC home on iron supplementation. Plan to follow up in 1-2 weeks with primary OB doctor.    Jillian Amin MD  OB/GYN Resident PGY3  Pager [phone]  17      "

## 2017-03-25 NOTE — ED NOTES
.  ED to Floor Handoff      S:  Satish Laguna is a 32 year old female who speaks English and lives with family members,  in a home  They arrived in the ED by ambulance from home with a complaint of Vaginal Bleeding    Initial vitals were:   BP: 101/65  Pulse: 94  Temp: 97.3  F (36.3  C)  Resp: 16  Weight: 104.3 kg (230 lb)  SpO2: 100 %  Allergies: No Known Allergies.  The meds given in the ED and their home medications are:   Current Facility-Administered Medications   Medication     0.9% sodium chloride infusion     Current Outpatient Prescriptions   Medication     Prenatal Vit-Fe Fumarate-FA (PRENATAL MULTIVITAMIN  PLUS IRON) 27-0.8 MG TABS per tablet     Acetaminophen (TYLENOL PO)     Facility-Administered Medications Ordered in Other Encounters   Medication     HYDROmorphone (PF) (DILAUDID) 1 MG/ML injection     Social demographics are   Social History     Social History     Marital status:      Spouse name: N/A     Number of children: N/A     Years of education: N/A     Social History Main Topics     Smoking status: Never Smoker     Smokeless tobacco: None     Alcohol use No     Drug use: No     Sexual activity: Not Asked     Other Topics Concern     None     Social History Narrative       B:   The patient has been ill for 1 day(s) and during this time the symptoms have increased.  In the ED was diagnosed with   Final diagnoses:   Vaginal bleeding    Infection/sepsis suspected:No Isolation type; No active isolations   A:   In the ED these meds were given:   Medications   0.9% sodium chloride BOLUS (0 mLs Intravenous Stopped 3/24/17 2044)     Followed by   0.9% sodium chloride infusion (1,000 mLs Intravenous New Bag 3/24/17 2212)   NaCl 0.9 % infusion (  New Bag 3/24/17 1810)     Drips running?  Yes  Labs results   Labs Ordered and Resulted from Time of ED Arrival Up to the Time of Departure from the ED   CBC WITH PLATELETS DIFFERENTIAL - Abnormal; Notable for the following:        Result Value    RBC  Count 2.93 (*)     Hemoglobin 9.0 (*)     Hematocrit 27.4 (*)     Absolute Neutrophil 8.4 (*)     All other components within normal limits   BASIC METABOLIC PANEL - Abnormal; Notable for the following:     Glucose 139 (*)     Calcium 8.3 (*)     All other components within normal limits   HCG QUANTITATIVE PREGNANCY - Abnormal; Notable for the following:     HCG Quantitative Serum 94431 (*)     All other components within normal limits   ROUTINE UA WITH MICROSCOPIC REFLEX TO CULTURE - Abnormal; Notable for the following:     Blood Urine Moderate (*)     Protein Albumin Urine 30 (*)     Leukocyte Esterase Urine Moderate (*)     RBC Urine >182 (*)     Bacteria Urine Few (*)     Mucous Urine Present (*)     All other components within normal limits   HEMOGLOBIN - Abnormal; Notable for the following:     Hemoglobin 6.6 (*)     All other components within normal limits   INR   IP ASSIGN PROVIDER TEAM TO TREATMENT TEAM   ABO/RH TYPE AND SCREEN   RED BLOOD CELL PREPARE ORDER UNIT   BLOOD COMPONENT   URINE CULTURE AEROBIC BACTERIAL     Imaging Studies:   Recent Results (from the past 24 hour(s))   US OB <14 Weeks W Transvaginal    Narrative    PELVIC ULTRASOUND 3/24/2017 7:25 PM    HISTORY: ? Complete AB    COMPARISON; 3/21/2017 OB ultrasound with irregular gestational sac in  the lower uterine segment with probable dilatation of the cervix.    TECHNIQUE: Transabdominal and endovaginal technique to better  visualize endometrial stripe and adnexa.    FINDINGS: Currently there is no gestational sac identified. There is a  0.5 cm cystic area or small amount of fluid in the upper cervix, the  cervical canal is thickened which could represent complex fluid but  cannot exclude retained products of conception in the cervix. There is  no fetus or cardiac activity identified.    Normal-appearing right ovary. Nonvisualized left ovary. No adnexal  mass or free fluid.    IMPRESSION;  1. Previously seen low lying gestation on 3/21/2017  no longer  identified compatible with spontaneous . There is nonspecific  thickening of the cervical canal which could be blood products in the  cervix. Favor blood products although retained products of conception  cannot be excluded.    MIREYA SRIVASTAVA MD     Recent vital signs /44  Pulse 94  Temp 97.3  F (36.3  C) (Oral)  Resp 16  Wt 104.3 kg (230 lb)  LMP  (LMP Unknown)  SpO2 100%  Breastfeeding? Unknown  BMI 34.97 kg/m2  Cardiac Rhythm: ,      Abnormal labs/tests/findings requiring intervention:---  Pain control: good  Nausea control: good  R:   Transfer assistance needed: Stand with Assist  Family present during ED course? Yes   Family currently present? Yes  Pt needs tele? No  Code Status: Full Code  Tasks needing to be completed:---    Anai Brown  [facility]-- [phone] [place] [facility]  [phone] [facility] ED

## 2017-03-25 NOTE — PLAN OF CARE
"Problem: Goal Outcome Summary  Goal: Goal Outcome Summary  hgb 6.9, OB called 7:17 & informed, they will discuss plan. Pt said she is feeling better, no dizziness. Pt said the bleeding has \"stoped\", just scant. No pain. Looking forward to [facility] today.   Pt discharged to home at 11:45. Alert, she said she needed to leave right away since her sister had to go to work & was her only ride. Pt also concerned about her 3 children & wanted to go home. Pt will follow up with her primary in two weeks, she will call them Monday. Pt was in a hurry & will get the meds at her own pharmacy.     "

## 2017-03-25 NOTE — PROGRESS NOTES
Amal reports that she is feeling well this morning.  She ate breakfast and tolerated regular diet.  She ambulated with dizziness.  She reports only spotting since midnight.  Her sister is here and she is eager for discharge.  She plans to take a week off of work.  She is OK with plan to take iron upon discharge.         Patient Active Problem List   Diagnosis     Anemia     History reviewed. No pertinent past medical history.  No family history on file.  History reviewed. No pertinent surgical history.  Social History   Substance Use Topics     Smoking status: Never Smoker     Smokeless tobacco: Not on file     Alcohol use No     Review of patient's allergies indicates no known allergies.      Current Facility-Administered Medications:      acetaminophen (TYLENOL) tablet 500 mg, 500 mg, Oral, Q8H PRN, Cindy Amin MD     naloxone (NARCAN) injection 0.1-0.4 mg, 0.1-0.4 mg, Intravenous, Q2 Min PRN, Cindy Amin MD     ondansetron (ZOFRAN-ODT) ODT tab 4 mg, 4 mg, Oral, Q6H PRN **OR** ondansetron (ZOFRAN) injection 4 mg, 4 mg, Intravenous, Q6H PRN, Cindy Amin MD     docusate sodium (COLACE) capsule 100 mg, 100 mg, Oral, BID, Cindy Amin MD     ibuprofen (ADVIL/MOTRIN) tablet 600 mg, 600 mg, Oral, Q6H PRN, Cindy Amin MD     0.9% sodium chloride infusion, , Intravenous, Continuous, Cindy Amin MD, Last Rate: 100 mL/hr at 03/25/17 0507     NaCl 0.9 % infusion, , , ,      [COMPLETED] 0.9% sodium chloride BOLUS, 1,000 mL, Intravenous, Once, Stopped at 03/24/17 2044 **FOLLOWED BY** 0.9% sodium chloride infusion, 1,000 mL, Intravenous, Continuous, Thelma Iraheta MD, Last Rate: 125 mL/hr at 03/24/17 2212, 1,000 mL at 03/24/17 2212    Facility-Administered Medications Ordered in Other Encounters:      HYDROmorphone (PF) (DILAUDID) 1 MG/ML injection, , , ,      ROS:    C: NEGATIVE for fever, chills, change in weight  I: NEGATIVE for  "worrisome rashes, moles or lesions  R: NEGATIVE for significant cough or SOB  B: NEGATIVE for masses, tenderness or discharge  CV: NEGATIVE for chest pain, palpitations or peripheral edema  GI: NEGATIVE for nausea, abdominal pain, heartburn, or change in bowel habits  : NEGATIVE for frequency, dysuria, or hematuria   female: minimal bleeding is reported   MUSCULOSKELETAL: no complaints  E: NEGATIVE for temperature intolerance, skin/hair changes  Neuro:  Negative for paresthesias, headaches  PSYCHIATRIC: no insomnia or mood complaints     OBJECTIVE:   /66 (BP Location: Right arm)  Pulse 96  Temp 97.3  F (36.3  C) (Oral)  Resp 16  Ht 1.727 m (5' 8\")  Wt 115.4 kg (254 lb 6.4 oz)  LMP  (LMP Unknown)  SpO2 100%  Breastfeeding? Unknown  BMI 38.68 kg/m2   BMI: Body mass index is 38.68 kg/(m^2).     EXAM:    Well developed, well-nourished woman who appears her stated age.  Neck:  Thyroid normal, no adenopathy.    Abdomen: Abdomen is soft, non tender.  No masses     Admission on 03/21/2017, Discharged on 03/21/2017   Component Date Value Ref Range Status     HCG Quantitative Serum 03/21/2017 18711* 0 - 5 IU/L Final     WBC 03/21/2017 7.4  4.0 - 11.0 10e9/L Final     RBC Count 03/21/2017 3.47* 3.8 - 5.2 10e12/L Final     Hemoglobin 03/21/2017 10.8* 11.7 - 15.7 g/dL Final     Hematocrit 03/21/2017 32.4* 35.0 - 47.0 % Final     MCV 03/21/2017 93  78 - 100 fl Final     MCH 03/21/2017 31.1  26.5 - 33.0 pg Final     MCHC 03/21/2017 33.3  31.5 - 36.5 g/dL Final     RDW 03/21/2017 14.1  10.0 - 15.0 % Final     Platelet Count 03/21/2017 265  150 - 450 10e9/L Final     Diff Method 03/21/2017 Automated Method   Final     % Neutrophils 03/21/2017 72.9  % Final     % Lymphocytes 03/21/2017 18.8  % Final     % Monocytes 03/21/2017 6.4  % Final     % Eosinophils 03/21/2017 1.1  % Final     % Basophils 03/21/2017 0.1  % Final     % Immature Granulocytes 03/21/2017 0.7  % Final     Nucleated RBCs 03/21/2017 0  0 /100 Final "     Absolute Neutrophil 2017 5.4  1.6 - 8.3 10e9/L Final     Absolute Lymphocytes 2017 1.4  0.8 - 5.3 10e9/L Final     Absolute Monocytes 2017 0.5  0.0 - 1.3 10e9/L Final     Absolute Eosinophils 2017 0.1  0.0 - 0.7 10e9/L Final     Absolute Basophils 2017 0.0  0.0 - 0.2 10e9/L Final     Abs Immature Granulocytes 2017 0.1  0 - 0.4 10e9/L Final     Absolute Nucleated RBC 2017 0.0   Final       Assessment: :  Satish Laguna is a 32 year old female who presents with   Chief Complaint   Patient presents with     Vaginal Bleeding        Plan:      ICD-10-CM    1. Spontaneous  O03.9 ibuprofen (ADVIL/MOTRIN) 600 MG tablet     ferrous sulfate (SLO-FE) 142 (45 FE) MG TBCR     calcium carbonate-vitamin D 600-400 MG-UNIT CHEW   2. Vaginal bleeding N93.9 ABO/Rh type and screen     Urine Culture Aerobic Bacterial     Blood component     Blood component     Hemoglobin     ferrous sulfate (SLO-FE) 142 (45 FE) MG TBCR   3. Iron deficiency anemia, unspecified iron deficiency anemia type D50.9 docusate sodium (COLACE) 100 MG capsule     ferrous sulfate (SLO-FE) 142 (45 FE) MG TBCR       Discharge to home on iron.  Colace prn constipation.  F/u 2 weeks in clinic.  Letter to employer re off work x 1 week    Rosa Mosher MD

## 2017-03-25 NOTE — PLAN OF CARE
Problem: Goal Outcome Summary  Goal: Goal Outcome Summary  Outcome: Improving  Observation goals PRIOR TO DISCHARGE     Comments: -post-blood transfusion   -vital signs normal or at patient baseline   -tolerating oral intake to maintain hydration   -adequate pain control on oral analgesics   -safe disposition plan has been identified   Nurse to notify provider when observation goals have been met and patient is ready for discharge.      0200- Blood transfusion paused d/t loss of IV access. Restarted after placement of new IV. VSS.  0400- First unit complete. Second unit not ordered to be prepared. OB notified, recheck Hgb in AM before administering a second unit.  0600- VSS, denies pain, tolerating oral intake.

## 2017-03-25 NOTE — H&P
Obstetrics and Gynecology H&P    HPI: Satish Laguna is a 32 year old  who presented to the ED with heavy vaginal bleeding and passage of tissue. She has been followed in this pregnancy for subchorionic hemorrhage and low lying intrauterine gestational sac, thought to be concerning for possible spontaneous . She reports that last night she started to have severe cramping for which she took tylenol. Around 2:30 this afternoon, she started to have heavy vaginal bleeding and passed some blue-crow tissue. She has never had a miscarriage before and asked her neighbor if that's what pregnancy tissue looked like, which she said it did. She did not keep the tissue. She says she continued to have vaginal bleeding and started to feel dizzy so she came to the ED. She has gotten some IV fluids and feels much better. She does still feel light-headed when up and ambulating but otherwise denies shortness of breath, chest pain, syncope. She had a Hgb of 10.8 on 3/21. Feels relieved overall that this is over as she has been having ultrasounds and clinic appointments. She wants to wait at least 4 months before attempting another pregnancy.     Reviewed records from [facility]:   3/21/17: Single IUP with gestational sac measuring 19w5d, low lying with lobulated contours. CRL measuring 3.3 cm and corresponding to 10w2d gestation.  bpm. No suspicious adnexal masses, no fluid fluid.     OBHx:   History of  section x 3  Denies h/o abnormal pap smears  Denies h/o STIs    PMH:   Anemia  History of gestational diabetes    PSH:    section x 3    Meds:   Prenatal vitamin  Tylenol PRN    Social Hx:   Social History   Substance Use Topics     Smoking status: Never Smoker     Smokeless tobacco: Not on file     Alcohol use No        Family History: No family history of breast, ovarian or uterine cancer. No history of DVT or migranes.    ROS:   Negative except as listed above in HPI    O: /65   Pulse 94  Temp 97.3  F (36.3  C) (Oral)  Resp 16  Wt 104.3 kg (230 lb)  LMP  (LMP Unknown)  SpO2 99%  Breastfeeding? Unknown  BMI 34.97 kg/m2  Constitutional: Healthy appearing female, no acute distress  HEENT: Normal appearance.  Neck supple, thyroid normal in size without nodularity or masses.  Cardiovascular: Regular rate and rhythm without murmurs, clicks, gallops or rub  Respiratory: Clear to auscultation bilaterally without crackles or wheeze  Breast Exam: No visible masses or suspicious skin changes.  No discrete or dominant masses to palpation.  No axillary lymphadenopathy.  Gastrointestinal: Abdomen soft, non-tender. BS normal. No masses, organomegaly  Pelvic Exam - : External genitalia normal well-estrogenized, healthy tissue. Moderate amount of bleeding on chux.   Skin: No suspicious lesions or rashes  Psychiatric: mentation appears normal and appropriate    Labs:  Component      Latest Ref Rng & Units 3/24/2017           5:56 PM   WBC      4.0 - 11.0 10e9/L 10.5   RBC Count      3.8 - 5.2 10e12/L 2.93 (L)   Hemoglobin      11.7 - 15.7 g/dL 9.0 (L)   Hematocrit      35.0 - 47.0 % 27.4 (L)   MCV      78 - 100 fl 94   MCH      26.5 - 33.0 pg 30.7   MCHC      31.5 - 36.5 g/dL 32.8   RDW      10.0 - 15.0 % 14.2   Platelet Count      150 - 450 10e9/L 245   Diff Method       Automated Method   % Neutrophils      % 80.1   % Lymphocytes      % 14.1   % Monocytes      % 4.8   % Eosinophils      % 0.6   % Basophils      % 0.1   % Immature Granulocytes      % 0.3   Nucleated RBCs      0 /100 0   Absolute Neutrophil      1.6 - 8.3 10e9/L 8.4 (H)   Absolute Lymphocytes      0.8 - 5.3 10e9/L 1.5   Absolute Monocytes      0.0 - 1.3 10e9/L 0.5   Absolute Eosinophils      0.0 - 0.7 10e9/L 0.1   Absolute Basophils      0.0 - 0.2 10e9/L 0.0   Abs Immature Granulocytes      0 - 0.4 10e9/L 0.0   Absolute Nucleated RBC       0.0   ABO       O   RH(D)       Pos   Antibody Screen       Neg   Test Valid Only At        [facility]   Specimen Expires       2017   INR      0.86 - 1.14 1.11   HCG Quantitative Serum      0 - 5 IU/L 86809 (H)     Component      Latest Ref Rng & Units 3/24/2017          10:13 PM   WBC      4.0 - 11.0 10e9/L    RBC Count      3.8 - 5.2 10e12/L    Hemoglobin      11.7 - 15.7 g/dL 6.6 (LL)   Hematocrit      35.0 - 47.0 %    MCV      78 - 100 fl    MCH      26.5 - 33.0 pg    MCHC      31.5 - 36.5 g/dL    RDW      10.0 - 15.0 %    Platelet Count      150 - 450 10e9/L    Diff Method          % Neutrophils      %    % Lymphocytes      %    % Monocytes      %    % Eosinophils      %    % Basophils      %    % Immature Granulocytes      %    Nucleated RBCs      0 /100    Absolute Neutrophil      1.6 - 8.3 10e9/L    Absolute Lymphocytes      0.8 - 5.3 10e9/L    Absolute Monocytes      0.0 - 1.3 10e9/L    Absolute Eosinophils      0.0 - 0.7 10e9/L    Absolute Basophils      0.0 - 0.2 10e9/L    Abs Immature Granulocytes      0 - 0.4 10e9/L    Absolute Nucleated RBC          ABO          RH(D)          Antibody Screen          Test Valid Only At          Specimen Expires          INR      0.86 - 1.14    HCG Quantitative Serum      0 - 5 IU/L        Imaging:  Pelvic US:  FINDINGS: Currently there is no gestational sac identified. There is a 0.5 cm cystic area or small amount of fluid in the upper cervix, the cervical canal is thickened which could represent complex fluid but cannot exclude retained products of conception in the cervix. There is no fetus or cardiac activity identified.     Normal-appearing right ovary. Nonvisualized left ovary. No adnexal mass or free fluid.     IMPRESSION;  1. Previously seen low lying gestation on 3/21/2017 no longer identified compatible with spontaneous . There is nonspecific thickening of the cervical canal which could be blood products in the cervix. Favor blood products although retained products of conception cannot be  excluded.       Assessment/Plan: Satish Laguna is a 32 year old  who presents to ED with complete spontaneous , now with acute blood loss anemia with Hgb of 6.6.    S/p complete :  - Patient passed most of the tissue at home. Ultrasound here demonstrates no IUP and fluid in cervical canal which is likely blood. No concern for ongoing hemorrhage although will monitor bleeding closely.  - Patient hemodynamically stable although significant acute blood loss anemia with Hgb of 6.6 from 9.0 on admission.   - Will admit for observation and blood transfusion of 1U PRBCs  - Continue to monitor closely  - Plan to DC home with PO iron  - Anticipate discharge home tomorrow if symptoms improve and bleeding stable    Discussed with Dr. Adorno who agrees with plan.    Jillian Amin MD  OB/GYN Resident PGY3  Pager [phone]  [phone]

## 2017-03-26 LAB
BACTERIA SPEC CULT: NO GROWTH
Lab: NORMAL
MICRO REPORT STATUS: NORMAL
SPECIMEN SOURCE: NORMAL

## 2017-10-30 ENCOUNTER — HOSPITAL ENCOUNTER (EMERGENCY)
Facility: CLINIC | Age: 32
Discharge: HOME | End: 2017-10-30

## 2019-04-19 ENCOUNTER — APPOINTMENT (OUTPATIENT)
Dept: ULTRASOUND IMAGING | Facility: CLINIC | Age: 34
End: 2019-04-19
Attending: EMERGENCY MEDICINE

## 2019-04-19 ENCOUNTER — APPOINTMENT (OUTPATIENT)
Dept: GENERAL RADIOLOGY | Facility: CLINIC | Age: 34
End: 2019-04-19
Attending: EMERGENCY MEDICINE

## 2019-04-19 ENCOUNTER — HOSPITAL ENCOUNTER (EMERGENCY)
Facility: CLINIC | Age: 34
Discharge: HOME | End: 2019-04-19
Attending: EMERGENCY MEDICINE | Admitting: EMERGENCY MEDICINE

## 2019-04-19 VITALS
OXYGEN SATURATION: 99 % | TEMPERATURE: 98.5 F | SYSTOLIC BLOOD PRESSURE: 111 MMHG | HEIGHT: 69 IN | DIASTOLIC BLOOD PRESSURE: 53 MMHG | BODY MASS INDEX: 37.03 KG/M2 | RESPIRATION RATE: 17 BRPM | WEIGHT: 250 LBS | HEART RATE: 73 BPM

## 2019-04-19 DIAGNOSIS — R07.9 CHEST PAIN, UNSPECIFIED TYPE: ICD-10-CM

## 2019-04-19 DIAGNOSIS — R60.0 PERIPHERAL EDEMA: ICD-10-CM

## 2019-04-19 LAB
ALBUMIN SERPL-MCNC: 2.8 G/DL (ref 3.4–5)
ALP SERPL-CCNC: 79 U/L (ref 40–150)
ALT SERPL W P-5'-P-CCNC: 26 U/L (ref 0–50)
ANION GAP SERPL CALCULATED.3IONS-SCNC: 6 MMOL/L (ref 3–14)
AST SERPL W P-5'-P-CCNC: 20 U/L (ref 0–45)
BASOPHILS # BLD AUTO: 0 10E9/L (ref 0–0.2)
BASOPHILS NFR BLD AUTO: 0.2 %
BILIRUB SERPL-MCNC: 0.3 MG/DL (ref 0.2–1.3)
BUN SERPL-MCNC: 11 MG/DL (ref 7–30)
CALCIUM SERPL-MCNC: 8.4 MG/DL (ref 8.5–10.1)
CHLORIDE SERPL-SCNC: 106 MMOL/L (ref 94–109)
CO2 SERPL-SCNC: 25 MMOL/L (ref 20–32)
CREAT SERPL-MCNC: 0.57 MG/DL (ref 0.52–1.04)
DIFFERENTIAL METHOD BLD: ABNORMAL
EOSINOPHIL # BLD AUTO: 0.2 10E9/L (ref 0–0.7)
EOSINOPHIL NFR BLD AUTO: 2.9 %
ERYTHROCYTE [DISTWIDTH] IN BLOOD BY AUTOMATED COUNT: 13.2 % (ref 10–15)
GFR SERPL CREATININE-BSD FRML MDRD: >90 ML/MIN/{1.73_M2}
GLUCOSE SERPL-MCNC: 115 MG/DL (ref 70–99)
HCT VFR BLD AUTO: 37.4 % (ref 35–47)
HGB BLD-MCNC: 11.6 G/DL (ref 11.7–15.7)
IMM GRANULOCYTES # BLD: 0 10E9/L (ref 0–0.4)
IMM GRANULOCYTES NFR BLD: 0.3 %
INTERPRETATION ECG - MUSE: NORMAL
LYMPHOCYTES # BLD AUTO: 1.2 10E9/L (ref 0.8–5.3)
LYMPHOCYTES NFR BLD AUTO: 20 %
MCH RBC QN AUTO: 30.6 PG (ref 26.5–33)
MCHC RBC AUTO-ENTMCNC: 31 G/DL (ref 31.5–36.5)
MCV RBC AUTO: 99 FL (ref 78–100)
MONOCYTES # BLD AUTO: 0.5 10E9/L (ref 0–1.3)
MONOCYTES NFR BLD AUTO: 8 %
NEUTROPHILS # BLD AUTO: 4.1 10E9/L (ref 1.6–8.3)
NEUTROPHILS NFR BLD AUTO: 68.6 %
NRBC # BLD AUTO: 0 10*3/UL
NRBC BLD AUTO-RTO: 0 /100
PLATELET # BLD AUTO: 264 10E9/L (ref 150–450)
POTASSIUM SERPL-SCNC: 4 MMOL/L (ref 3.4–5.3)
PROT SERPL-MCNC: 6.9 G/DL (ref 6.8–8.8)
RBC # BLD AUTO: 3.79 10E12/L (ref 3.8–5.2)
SODIUM SERPL-SCNC: 137 MMOL/L (ref 133–144)
TROPONIN I SERPL-MCNC: <0.015 UG/L (ref 0–0.04)
TROPONIN I SERPL-MCNC: <0.015 UG/L (ref 0–0.04)
WBC # BLD AUTO: 5.9 10E9/L (ref 4–11)

## 2019-04-19 PROCEDURE — 84484 ASSAY OF TROPONIN QUANT: CPT | Performed by: EMERGENCY MEDICINE

## 2019-04-19 PROCEDURE — 93970 EXTREMITY STUDY: CPT

## 2019-04-19 PROCEDURE — 85025 COMPLETE CBC W/AUTO DIFF WBC: CPT | Performed by: EMERGENCY MEDICINE

## 2019-04-19 PROCEDURE — 99285 EMERGENCY DEPT VISIT HI MDM: CPT | Mod: 25 | Performed by: EMERGENCY MEDICINE

## 2019-04-19 PROCEDURE — 71046 X-RAY EXAM CHEST 2 VIEWS: CPT

## 2019-04-19 PROCEDURE — 93005 ELECTROCARDIOGRAM TRACING: CPT | Performed by: EMERGENCY MEDICINE

## 2019-04-19 PROCEDURE — 80053 COMPREHEN METABOLIC PANEL: CPT | Performed by: EMERGENCY MEDICINE

## 2019-04-19 PROCEDURE — 93010 ELECTROCARDIOGRAM REPORT: CPT | Mod: Z6 | Performed by: EMERGENCY MEDICINE

## 2019-04-19 ASSESSMENT — MIFFLIN-ST. JEOR: SCORE: 1903.37

## 2019-04-19 NOTE — ED AVS SNAPSHOT
[facility], [place], Emergency Department  [address] [place] [id number]  Phone:  [phone]                                    Satish Laguna   MRN: [id number]    Department:  [facility], Emergency Department   Date of Visit:  4/19/2019           After Visit Summary Signature Page    I have received my discharge instructions, and my questions have been answered. I have discussed any challenges I see with this plan with the nurse or doctor.    ..........................................................................................................................................  Patient/Patient Representative Signature      ..........................................................................................................................................  Patient Representative Print Name and Relationship to Patient    ..................................................               ................................................  Date                                   Time    ..........................................................................................................................................  Reviewed by Signature/Title    ...................................................              ..............................................  Date                                               Time          22EPIC Rev 08/18

## 2019-04-19 NOTE — ED PROVIDER NOTES
"  History     Chief Complaint   Patient presents with     Chest Pain     HPI  Satish Laguna is a 34 year old female who has no significant past medical history who presents the emergency department from home by EMS with a complaint of chest pain.  Patient is approximately 3 months postpartum with normal spontaneous vaginal delivery on 1/25/2019.  Patient reports this morning that around 0200 am she woke up to feed her daughter and she developed some chest pain.  Patient describes the pain as a sharp pain on the right side of her chest with no radiation, no aggravating, no relieving factors.  Patient reports the pain was constant from 2:30 AM to 4 PM when she called the ambulance.  Patient reports that the ambulance gave her an IV, aspirin, and once her nitro felt with complete resolution of the pain.  Patient denies any fever, chills, nausea, vomiting, shortness of breath, abdominal pain, dysuria.  Patient does report bilateral swelling over the past few days.  Patient denies any calf tenderness.  Patient states she did have spicy pasta for dinner last night.  No other complaints.    I have reviewed the Medications, Allergies, Past Medical and Surgical History, and Social History in the Epic system.    Review of Systems   Cardiovascular: Positive for chest pain and leg swelling.   All other systems reviewed and are negative.      Physical Exam   BP: 116/76  Pulse: 73  Heart Rate: 73  Temp: 98.5  F (36.9  C)  Resp: 16  Height: 175.3 cm (5' 9\")  Weight: 113.4 kg (250 lb)  SpO2: 100 %      Physical Exam   Constitutional: She is oriented to person, place, and time. She appears well-developed. No distress.   HENT:   Head: Normocephalic and atraumatic.   Right Ear: External ear normal.   Left Ear: External ear normal.   Mouth/Throat: Oropharynx is clear and moist.   Eyes: Pupils are equal, round, and reactive to light. Conjunctivae and EOM are normal.   Neck: Normal range of motion. Neck supple.   Cardiovascular: Normal " rate, regular rhythm and normal heart sounds.   Pulmonary/Chest: Effort normal and breath sounds normal. No respiratory distress. She has no wheezes. She has no rales.   Abdominal: Soft. She exhibits no distension. There is no tenderness. There is no rebound and no guarding.   Musculoskeletal: Normal range of motion. She exhibits edema (Bilateral symmetrical lower extremity edema). She exhibits no tenderness or deformity.   Neurological: She is alert and oriented to person, place, and time. No cranial nerve deficit. Coordination normal.   Skin: Skin is warm and dry. No rash noted.   Psychiatric: She has a normal mood and affect. Her behavior is normal.       ED Course        Procedures      .  Results for orders placed or performed during the hospital encounter of 04/19/19   XR Chest 2 Views    Narrative    EXAM: XR CHEST 2 VW  4/19/2019 6:49 AM      HISTORY: chest pain, shortness of breath, post partum    COMPARISON: No chest comparison available, CT 1/17/2007    FINDINGS:     Two views of the chest. The cardiomediastinal silhouette is within  normal limits. No focal airspace opacities. No pleural effusion or  pneumothorax.       Impression    IMPRESSION: No acute cardiopulmonary abnormality.    I have personally reviewed the examination and initial interpretation  and I agree with the findings.    ADELAIDA GUTIERREZ MD   CBC with platelets differential   Result Value Ref Range    WBC 5.9 4.0 - 11.0 10e9/L    RBC Count 3.79 (L) 3.8 - 5.2 10e12/L    Hemoglobin 11.6 (L) 11.7 - 15.7 g/dL    Hematocrit 37.4 35.0 - 47.0 %    MCV 99 78 - 100 fl    MCH 30.6 26.5 - 33.0 pg    MCHC 31.0 (L) 31.5 - 36.5 g/dL    RDW 13.2 10.0 - 15.0 %    Platelet Count 264 150 - 450 10e9/L    Diff Method Automated Method     % Neutrophils 68.6 %    % Lymphocytes 20.0 %    % Monocytes 8.0 %    % Eosinophils 2.9 %    % Basophils 0.2 %    % Immature Granulocytes 0.3 %    Nucleated RBCs 0 0 /100    Absolute Neutrophil 4.1 1.6 - 8.3 10e9/L     Absolute Lymphocytes 1.2 0.8 - 5.3 10e9/L    Absolute Monocytes 0.5 0.0 - 1.3 10e9/L    Absolute Eosinophils 0.2 0.0 - 0.7 10e9/L    Absolute Basophils 0.0 0.0 - 0.2 10e9/L    Abs Immature Granulocytes 0.0 0 - 0.4 10e9/L    Absolute Nucleated RBC 0.0    Comprehensive metabolic panel   Result Value Ref Range    Sodium 137 133 - 144 mmol/L    Potassium 4.0 3.4 - 5.3 mmol/L    Chloride 106 94 - 109 mmol/L    Carbon Dioxide 25 20 - 32 mmol/L    Anion Gap 6 3 - 14 mmol/L    Glucose 115 (H) 70 - 99 mg/dL    Urea Nitrogen 11 7 - 30 mg/dL    Creatinine 0.57 0.52 - 1.04 mg/dL    GFR Estimate >90 >60 mL/min/[1.73_m2]    GFR Estimate If Black >90 >60 mL/min/[1.73_m2]    Calcium 8.4 (L) 8.5 - 10.1 mg/dL    Bilirubin Total 0.3 0.2 - 1.3 mg/dL    Albumin 2.8 (L) 3.4 - 5.0 g/dL    Protein Total 6.9 6.8 - 8.8 g/dL    Alkaline Phosphatase 79 40 - 150 U/L    ALT 26 0 - 50 U/L    AST 20 0 - 45 U/L   Troponin I   Result Value Ref Range    Troponin I ES <0.015 0.000 - 0.045 ug/L   EKG 12 lead   Result Value Ref Range    Interpretation ECG Click View Image link to view waveform and result             EKG Interpretation:      Interpreted by Joanna Esquivel  Time reviewed: 0549  Symptoms at time of EKG: chest pain   Rhythm: normal sinus   Rate: normal  Axis: normal  Ectopy: none  Conduction: normal  ST Segments/ T Waves: No ST-T wave changes  Q Waves: none  Comparison to prior: Unchanged from 3/10/2007    Clinical Impression: normal EKG, no acute ischemic change       Assessments & Plan (with Medical Decision Making)   Satish Laguna is a 34 year old female who has no significant past medical history who presents the emergency department from home by EMS with a complaint of chest pain.  Patient is currently 3 months postpartum.  Upon arrival patient is well-appearing, afebrile, no distress.  Patient reports no symptoms or complaints in the emergency department.  Patient reports resolution of chest pain.  Denied any weakness, shortness of  breath, fever, no other symptoms.  Physical examination unremarkable except for bilateral symmetrical lower extremity edema that she reports is new over the past few days, no calf tenderness.  At this time plan for EKG, labs, chest x-ray, ultrasound of her bilateral lower extremities, reevaluate.    Reviewed EKG which demonstrates normal sinus rhythm with no acute ischemic change.  I reviewed comprehensive labs which are unremarkable with no leukocytosis white blood cell count 5.9, hemoglobin 11.6, no acute metabolic or electrolyte abnormality, negative troponin, no transaminitis.  I reviewed chest x-ray which is unremarkable with no acute cardiopulmonary process, no infiltrate, cardiomegaly.  Will repeat troponin, ultrasound but pending.  If negative likely discharge home and close follow-up with primary care provider OB/GYN next week.  Patient signed out to morning provider Dr. Flores.     I have reviewed the nursing notes.    I have reviewed the findings, diagnosis, plan and need for follow up with the patient.     Medication List      There are no discharge medications for this visit.         Final diagnoses:   Chest pain, unspecified type   Peripheral edema       4/19/2019   [facility], [place], EMERGENCY DEPARTMENT     Joanna Esquivel MD  04/19/19 3672

## 2019-04-19 NOTE — ED PROVIDER NOTES
"     Emergency Department Patient Sign-out       Brief HPI:  This is a 34 year old female signed out to me by Dr. Esquivel .  See initial ED Provider note for details of the presentation.            Significant Events prior to my assuming care: Patient 3 months post partum with sudden onset right sided chest pain. Incidentally with bilateral lower extremity edema. Initial trop and ekg non ischemic. Pain has not reoccurred since arrival.       Exam:   Patient Vitals for the past 24 hrs:   BP Temp Temp src Pulse Heart Rate Resp SpO2 Height Weight   04/19/19 0550 -- -- -- -- 73 20 100 % -- --   04/19/19 0534 116/76 98.5  F (36.9  C) Oral 73 -- 16 100 % 1.753 m (5' 9\") 113.4 kg (250 lb)           ED RESULTS:   Results for orders placed or performed during the hospital encounter of 04/19/19 (from the past 24 hour(s))   CBC with platelets differential     Status: Abnormal    Collection Time: 04/19/19  5:39 AM   Result Value Ref Range    WBC 5.9 4.0 - 11.0 10e9/L    RBC Count 3.79 (L) 3.8 - 5.2 10e12/L    Hemoglobin 11.6 (L) 11.7 - 15.7 g/dL    Hematocrit 37.4 35.0 - 47.0 %    MCV 99 78 - 100 fl    MCH 30.6 26.5 - 33.0 pg    MCHC 31.0 (L) 31.5 - 36.5 g/dL    RDW 13.2 10.0 - 15.0 %    Platelet Count 264 150 - 450 10e9/L    Diff Method Automated Method     % Neutrophils 68.6 %    % Lymphocytes 20.0 %    % Monocytes 8.0 %    % Eosinophils 2.9 %    % Basophils 0.2 %    % Immature Granulocytes 0.3 %    Nucleated RBCs 0 0 /100    Absolute Neutrophil 4.1 1.6 - 8.3 10e9/L    Absolute Lymphocytes 1.2 0.8 - 5.3 10e9/L    Absolute Monocytes 0.5 0.0 - 1.3 10e9/L    Absolute Eosinophils 0.2 0.0 - 0.7 10e9/L    Absolute Basophils 0.0 0.0 - 0.2 10e9/L    Abs Immature Granulocytes 0.0 0 - 0.4 10e9/L    Absolute Nucleated RBC 0.0    Comprehensive metabolic panel     Status: Abnormal    Collection Time: 04/19/19  5:39 AM   Result Value Ref Range    Sodium 137 133 - 144 mmol/L    Potassium 4.0 3.4 - 5.3 mmol/L    Chloride 106 94 - 109 mmol/L "    Carbon Dioxide 25 20 - 32 mmol/L    Anion Gap 6 3 - 14 mmol/L    Glucose 115 (H) 70 - 99 mg/dL    Urea Nitrogen 11 7 - 30 mg/dL    Creatinine 0.57 0.52 - 1.04 mg/dL    GFR Estimate >90 >60 mL/min/[1.73_m2]    GFR Estimate If Black >90 >60 mL/min/[1.73_m2]    Calcium 8.4 (L) 8.5 - 10.1 mg/dL    Bilirubin Total 0.3 0.2 - 1.3 mg/dL    Albumin 2.8 (L) 3.4 - 5.0 g/dL    Protein Total 6.9 6.8 - 8.8 g/dL    Alkaline Phosphatase 79 40 - 150 U/L    ALT 26 0 - 50 U/L    AST 20 0 - 45 U/L   Troponin I     Status: None    Collection Time: 04/19/19  5:39 AM   Result Value Ref Range    Troponin I ES <0.015 0.000 - 0.045 ug/L   EKG 12 lead     Status: None (Preliminary result)    Collection Time: 04/19/19  5:49 AM   Result Value Ref Range    Interpretation ECG Click View Image link to view waveform and result    XR Chest 2 Views     Status: None    Collection Time: 04/19/19  6:49 AM    Narrative    EXAM: XR CHEST 2 VW  4/19/2019 6:49 AM      HISTORY: chest pain, shortness of breath, post partum    COMPARISON: No chest comparison available, CT 1/17/2007    FINDINGS:     Two views of the chest. The cardiomediastinal silhouette is within  normal limits. No focal airspace opacities. No pleural effusion or  pneumothorax.       Impression    IMPRESSION: No acute cardiopulmonary abnormality.    I have personally reviewed the examination and initial interpretation  and I agree with the findings.    ADELAIDA GUTIERREZ MD       ED MEDICATIONS:   Medications - No data to display      Impression:    ICD-10-CM    1. Chest pain, unspecified type R07.9    2. Peripheral edema R60.9        Plan:    Pending studies include Repeat Troponin, bilateral LE DVT US.      Repeat trop neg. US neg for DVT. Patient discharged with AVS prepared by Dr. Esquivel.     Tracy Castillo MD  04/19/19 1276

## 2019-04-19 NOTE — ED NOTES
Bed: ED08  Expected date:   Expected time: 5:23 AM  Means of arrival: Ambulance  Comments:  [id number] - 34F with right-sided chest pain - triaged yellow

## 2019-05-19 NOTE — ED NOTES
Patient did not reply to call for triage at 1641. Patient LWBS before triage. Patient was not available to sign LWBS before triage paperwork.    none